# Patient Record
Sex: MALE | Race: WHITE | Employment: OTHER | URBAN - METROPOLITAN AREA
[De-identification: names, ages, dates, MRNs, and addresses within clinical notes are randomized per-mention and may not be internally consistent; named-entity substitution may affect disease eponyms.]

---

## 2018-05-02 ENCOUNTER — HOSPITAL ENCOUNTER (OUTPATIENT)
Age: 83
Discharge: HOME | End: 2018-05-04
Payer: MEDICARE

## 2018-05-02 LAB — PROSTATE SPECIFIC ANTIGEN: 3.81 NG/ML (ref 0–4)

## 2018-05-02 PROCEDURE — 84153 ASSAY OF PSA TOTAL: CPT

## 2018-05-08 PROBLEM — M54.12 CHRONIC RADICULAR CERVICAL PAIN: Status: ACTIVE | Noted: 2018-05-08

## 2018-05-08 PROBLEM — G56.03 BILATERAL CARPAL TUNNEL SYNDROME: Status: ACTIVE | Noted: 2018-05-08

## 2018-05-08 PROBLEM — G89.29 CHRONIC RADICULAR CERVICAL PAIN: Status: ACTIVE | Noted: 2018-05-08

## 2018-05-08 PROBLEM — G31.84 MCI (MILD COGNITIVE IMPAIRMENT): Status: ACTIVE | Noted: 2018-05-08

## 2018-07-17 ENCOUNTER — PROCEDURE VISIT (OUTPATIENT)
Dept: NEUROLOGY | Age: 83
End: 2018-07-17
Payer: MEDICARE

## 2018-07-17 DIAGNOSIS — M54.13 RADICULOPATHY OF CERVICOTHORACIC REGION: ICD-10-CM

## 2018-07-17 PROCEDURE — 95886 MUSC TEST DONE W/N TEST COMP: CPT | Performed by: PSYCHIATRY & NEUROLOGY

## 2018-07-17 PROCEDURE — 95913 NRV CNDJ TEST 13/> STUDIES: CPT | Performed by: PSYCHIATRY & NEUROLOGY

## 2018-07-17 NOTE — PROGRESS NOTES
[facility]  [facility]          Full Name: Carrie Mayes Gender: Male  MRN: [id number] YOB: 1931  Location[de-identified] Outpt. Visit Date: 7/17/2018 14:38  Age: 80 Years 6 Months Old  Examining Physician: Dr. Surjit Gonzalez  Referring Physician: Dr. Duglas Hart  Technician: Marcio Del Castillo   Height: 6 feet 0 inch  Weight: 195 lbs  Notes: Bilat. CTS; Chronic radicular cerv. pain      Motor NCS      Nerve / Sites Latency Amplitude Amp. 1-2 Distance Lat Diff Velocity Temp.    ms mV % cm ms m/s °C   R Median - APB      Wrist 4.01 9.0 100 8   32.9      Elbow 9.06 8.2 91.4 24 5.05 48 32.9   L Median - APB      Wrist 4.17 4.4 100 8   33.3      Elbow 9.17 4.4 99.2 24 5.00 48 33.3   R Ulnar - ADM      Wrist 3.85 6.2 100 8   32.9      B. Elbow 7.86 5.7 91.4 22 4.01 55 33      A. Elbow 10.42 5.4 86.4 10 2.55 39 32.9   L Ulnar - ADM      Wrist 4.06 5.7 100 8   33.3      B. Elbow 8.39 5.7 99.7 22 4.32 51 33.4      A. Elbow 10.57 5.7 99.3 10 2.19 46 33.3   R Ulnar - FDI      Wrist 4.38 3.0 100    32.9      B. Elbow 8.85 3.0 99.7 22 4.48 49 32.9      A. Elbow 11.15 2.8 91.6 10 2.29 44 32.9   L Ulnar - FDI      Wrist 4.79 4.1 100    33.1      B. Elbow 9.43 4.0 99.2 22 4.64 47 33      A. Elbow 11.25 4.0 97.7 10 1.82 55 33       Sensory NCS      Nerve / Sites Onset Lat Peak Lat PP Amp Amp. 1-2 Distance Velocity Temp.    ms ms µV % cm m/s °C   R Median - Digit II (Antidromic)      Wrist 1.56 2.34 12.5 100 14 90 32.7      Mid Palm 3.23 4.11 8.5 100 7 22 32.7   L Median - Digit II (Antidromic)      Wrist 1.46 2.34 12.2 100 14 96 33.3      Mid Palm 3.70 4.06 10.7 59.3 7 19 33.3   R Ulnar - Digit V (Antidromic)      Wrist 3.07 3.96 6.3 100 14 46 32.9   L Ulnar - Digit V (Antidromic)      Wrist 3.18 4.06 5.7 100 14 44 33.3   R Radial - Anatomical  (Forearm)      Forearm 2.08 2.92 4.7 100 10 48 32.7   L Radial - Anatomical  (Forearm)      Forearm 2.14 2.66 3.1 100 10 47 32.9   R Medial antebrachial cutaneous - Forearm (Elbow)      Elbow 2.24 2. 76 5.7 100 14 63 32.4   L Medial antebrachial cutaneous - Forearm (Elbow)      Elbow NR NR NR NR 14 NR 32.7   R Lateral antebrachial cutaneous - Forearm (Elbow)      Elbow 1.98 2.60 12.4 100 14 71 32.5   L Lateral antebrachial cutaneous - Forearm (Elbow)      Elbow NR NR NR NR 14 NR 32.6   R Posterior antebrachial cutaneous - Forearm (Elbow)      Elbow 1.72 2.29 4.6 100 14 81 32.4   L Posterior antebrachial cutaneous - Forearm (Elbow)      Elbow 1.98 2.34 3.4 100 14 71 32.9       Combined Sensory Index      Nerve / Sites Rec. Site Peak Lat NP Amp PP Amp Segments Peak Diff Temp. ms µV µV  ms °C   R Median - CSI      Median Thumb 3.39 3.0 4.5 Median - Radial 0.83 33      Radial Thumb 2.55 2.0 1.8 Median - Ulnar NR 32.9      Median Ring 3.91 1.5 2.6 Median palm - Ulnar palm 0.26 33      Ulnar Ring NR NR NR         Median palm Wrist 1.93 45.3 78.0         Ulnar palm Wrist 1.67 3.2 8.8         CSI     CSI 1.09    L Median - CSI      Median Thumb 3.91 2.7 11.1 Median - Radial 0.21 33.3      Radial Thumb 3.70 0.38 3.1 Median - Ulnar 0.16 33.3      Median Ring 4.53 3.0 6.3 Median palm - Ulnar palm 0.10 33      Ulnar Ring 4.38 4.9 3.1         Median palm Wrist 2.45 6.5 16.9         Ulnar palm Wrist 2.34 3.7 6.9         CSI     CSI 0.47            F  Wave      Nerve F Lat M Lat F-M Lat    ms ms ms   R Median - APB 33.7 4.1 29.6   R Ulnar - ADM 37.4 3.9 33.6   L Median - APB 34.1 3.4 30.7   L Ulnar - ADM 35.4 4.3 31.0       EMG         EMG Summary Table     Spontaneous MUAP Recruitment   Muscle IA Fib PSW Fasc H.F. Amp Dur. PPP Pattern   L. First dorsal interosseous Normal None None None None Normal Normal 1+ Markedly Dec   L. Abductor pollicis brevis Normal None None None None Normal Normal 1+ Decr   L. Abductor digiti minimi (manus) Normal None None None None Normal Normal 3+ Decr   L. Flexor pollicis longus Normal None None None None Normal Normal 1+ Decr   L.  Extensor indicis proprius Normal None None None None >/=170 cm tall  Age 48 to 61 >/=170 cm tall  Age 61 to 78 >/=170 cm tall  Amplitude drop from ankle to knee  % amplitude drop ankle to knee   4.4  5.8  5.3  5.3  1.1  5.8  5.3  5.3  1.1  5.8  5.3  5.3  1.1  10.3  71%   39  44  44  40  40  42  42  34  34  37  37  34  34   6.1  6.1  6.1  6.1  6.1  6.1  6.1  6.1  6.1  6.1  6.1  6.1  6.1     Ulnar motor (FDI)  Age <9  Age 7-34  Age 35-46  Age 52-63  Age >57   >8  >8  >7  >7  >7   >51  >51  >50  >50  >50   <3.8  <3.8  <4.3  <4.5  <4.5     Radial motor (EDC)  Age <9  Age 7-34  Age 35-46  Age 52-63  Age >57   >6  >6  >6  >5  >5   >47  >47  >50  >50  >50   <3.0  <3.0  <3.1  <3.1  <3.1   Musculocutaneous motor (Biceps)   Age <9  Age 7-34  Age 35-46  Age 52-63  Age >57   >4  >4  >4  >4  >3    <3.5  <3.5  <3.5  <3.5  <3.8   Axillary motor (Deltoid)  Age <9  Age 7-34  Age 35-46  Age 52-63  Age >57   >4  >4  >4  >4  >3    <4.8  <4.8  <4.8  <4.8  <5     Tibial motor (ADQP)  Age <9  Age 7-34  Age 35-46  Age 52-63  Age >57   >4  >4  >4  >3  >3   >41  >41  >41  >40  >40   <6.0  <6.0  <6.5  <6.5  <6.5   Peroneal motor (TA)  Age <9  Age 7-32>4  Age 35-46  Age 52-63  Age >57   >4  >4  >4  >3  >3   >41  >41  >40  >40  >40   <4  <4  <4  <4.5  <4.5     Femoral motor (RF)  Age <9  Age 7-34  Age 35-46  Age 52-63  Age >57   >4  >4  >4  >3  >3      <6.0  <6.0  <6.5  <6.5  <6.5         Nerve F  Minimal latency (ms)   Median (APB) 32   Ulnar (ADM)  32   Peroneal motor (EDB) <56   Tibial motor (AH) <56     Nerve  H-reflex latency (ms) H reflex- side to side latency  difference (ms)   Tibial  (gatroc-soleus) <34  <1.5

## 2018-08-10 ENCOUNTER — HOSPITAL ENCOUNTER (OUTPATIENT)
Age: 83
Discharge: HOME | End: 2018-08-12
Payer: MEDICARE

## 2018-08-10 PROCEDURE — 84154 ASSAY OF PSA FREE: CPT

## 2018-08-10 PROCEDURE — 84153 ASSAY OF PSA TOTAL: CPT

## 2018-08-13 LAB
PROSTATE SPECIFIC ANTIGEN FREE: 0.3 NG/ML
PROSTATE SPECIFIC ANTIGEN PERCENT FREE: 9 %
PROSTATE SPECIFIC ANTIGEN: 3.5 NG/ML (ref 0–4)

## 2018-11-27 ENCOUNTER — HOSPITAL ENCOUNTER (OUTPATIENT)
Age: 83
Discharge: HOME | End: 2018-11-29
Payer: MEDICARE

## 2018-11-27 LAB — PROSTATE SPECIFIC ANTIGEN: 3.67 NG/ML (ref 0–4)

## 2018-11-27 PROCEDURE — 84153 ASSAY OF PSA TOTAL: CPT

## 2019-04-03 ENCOUNTER — HOSPITAL ENCOUNTER (OUTPATIENT)
Age: 84
Discharge: HOME | End: 2019-04-05
Payer: MEDICARE

## 2019-04-03 LAB — PROSTATE SPECIFIC ANTIGEN: 3.63 NG/ML (ref 0–4)

## 2019-04-03 PROCEDURE — 84153 ASSAY OF PSA TOTAL: CPT

## 2019-08-07 ENCOUNTER — HOSPITAL ENCOUNTER (OUTPATIENT)
Age: 84
Discharge: HOME | End: 2019-08-09
Payer: MEDICARE

## 2019-08-07 PROCEDURE — 88305 TISSUE EXAM BY PATHOLOGIST: CPT

## 2019-09-25 ENCOUNTER — TELEPHONE (OUTPATIENT)
Dept: ENT CLINIC | Age: 84
End: 2019-09-25

## 2019-09-25 NOTE — TELEPHONE ENCOUNTER
Dr Mayes [facility] called to schedule this patient with a ruptured right tympanic membrane. He is scheduled on 11/19/19 at 8:30. Dr Romel Zimmer wanted him to be seen as soon as possible. Referral is urgent. Please call to move patient up. Thank you.

## 2019-09-26 ENCOUNTER — OFFICE VISIT (OUTPATIENT)
Dept: ENT CLINIC | Age: 84
End: 2019-09-26
Payer: MEDICARE

## 2019-09-26 ENCOUNTER — PROCEDURE VISIT (OUTPATIENT)
Dept: AUDIOLOGY | Age: 84
End: 2019-09-26
Payer: MEDICARE

## 2019-09-26 VITALS
SYSTOLIC BLOOD PRESSURE: 120 MMHG | WEIGHT: 195 LBS | HEIGHT: 71 IN | HEART RATE: 68 BPM | DIASTOLIC BLOOD PRESSURE: 72 MMHG | BODY MASS INDEX: 27.3 KG/M2

## 2019-09-26 DIAGNOSIS — H72.91 PERFORATION OF RIGHT TYMPANIC MEMBRANE: ICD-10-CM

## 2019-09-26 DIAGNOSIS — H93.8X1 IRRITATION OF RIGHT EAR: ICD-10-CM

## 2019-09-26 DIAGNOSIS — H91.93 DECREASED HEARING OF BOTH EARS: ICD-10-CM

## 2019-09-26 DIAGNOSIS — H61.21 IMPACTED CERUMEN, RIGHT EAR: Primary | ICD-10-CM

## 2019-09-26 PROCEDURE — 69210 REMOVE IMPACTED EAR WAX UNI: CPT | Performed by: PHYSICIAN ASSISTANT

## 2019-09-26 PROCEDURE — 99203 OFFICE O/P NEW LOW 30 MIN: CPT | Performed by: PHYSICIAN ASSISTANT

## 2019-09-26 PROCEDURE — 92567 TYMPANOMETRY: CPT | Performed by: AUDIOLOGIST

## 2019-09-26 RX ORDER — CIPROFLOXACIN HYDROCHLORIDE 3.5 MG/ML
SOLUTION/ DROPS TOPICAL
Qty: 1 BOTTLE | Refills: 0 | Status: SHIPPED | OUTPATIENT
Start: 2019-09-26 | End: 2019-10-06

## 2019-09-26 ASSESSMENT — ENCOUNTER SYMPTOMS
EYES NEGATIVE: 1
COUGH: 0
GASTROINTESTINAL NEGATIVE: 1
VOMITING: 0
RESPIRATORY NEGATIVE: 1
NAUSEA: 0

## 2019-09-26 NOTE — PROGRESS NOTES
completed. DIAGNOSIS:    ICD-10-CM    1. Impacted cerumen, right ear H61.21 NC REMOVAL IMPACTED CERUMEN INSTRUMENTATION UNILAT   2. Irritation of right ear H93.8X1      IMPRESSION/PLAN:  Cerumen removed. Hearing improved and close to baseline as per patient. Continues to have mild echoing of the right ear- tymps completed no abnormalities. Advised that hearing eval can be completed for further assessment if not improved in a couple days. No ear infection on exam. Excoriation of right ear canal- ciprofloxacin drops to be used for 5 days to help treat any early infection. Amoxicillin can be discontinued. If any further assessment of hearing- with discussion of hearing aids, further assessment of dizziness- may consider vestibular therapy in the future if needed, or cerumen recheck is desired advised to call the office to be seen again. Otherwise continue closely following with PCP. The plan was explained and patient is without any further questions. Follow up prn, or if any continuing, new or worsening symptoms call the office to be seen sooner or report to the emergency department. This note was done using voice recognition software. There may be accidental errors in grammar or spelling.    ALETHEA Lay

## 2019-10-10 ENCOUNTER — HOSPITAL ENCOUNTER (OUTPATIENT)
Age: 84
Discharge: HOME | End: 2019-10-12
Payer: MEDICARE

## 2019-10-10 LAB — PROSTATE SPECIFIC ANTIGEN: 3.8 NG/ML (ref 0–4)

## 2019-10-10 PROCEDURE — 84153 ASSAY OF PSA TOTAL: CPT

## 2021-04-05 ENCOUNTER — HOSPITAL ENCOUNTER (EMERGENCY)
Age: 86
Discharge: HOME | End: 2021-04-05
Attending: EMERGENCY MEDICINE
Payer: MEDICARE

## 2021-04-05 ENCOUNTER — APPOINTMENT (OUTPATIENT)
Dept: GENERAL RADIOLOGY | Age: 86
End: 2021-04-05
Payer: MEDICARE

## 2021-04-05 VITALS
HEIGHT: 72 IN | RESPIRATION RATE: 16 BRPM | TEMPERATURE: 98.6 F | OXYGEN SATURATION: 96 % | BODY MASS INDEX: 24.38 KG/M2 | DIASTOLIC BLOOD PRESSURE: 58 MMHG | SYSTOLIC BLOOD PRESSURE: 120 MMHG | HEART RATE: 84 BPM | WEIGHT: 180 LBS

## 2021-04-05 DIAGNOSIS — Z23 NEED FOR TDAP VACCINATION: ICD-10-CM

## 2021-04-05 DIAGNOSIS — S51.811A FOREARM LACERATION, RIGHT, INITIAL ENCOUNTER: ICD-10-CM
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
## 2021-04-05 PROCEDURE — 6370000000 HC RX 637 (ALT 250 FOR IP): Performed by: EMERGENCY MEDICINE

## 2021-04-05 PROCEDURE — 12002 RPR S/N/AX/GEN/TRNK2.6-7.5CM: CPT

## 2021-04-05 PROCEDURE — 90715 TDAP VACCINE 7 YRS/> IM: CPT | Performed by: EMERGENCY MEDICINE

## 2021-04-05 PROCEDURE — 6360000002 HC RX W HCPCS: Performed by: EMERGENCY MEDICINE

## 2021-04-05 PROCEDURE — 90471 IMMUNIZATION ADMIN: CPT | Performed by: EMERGENCY MEDICINE

## 2021-04-05 PROCEDURE — 73090 X-RAY EXAM OF FOREARM: CPT

## 2021-04-05 PROCEDURE — 99283 EMERGENCY DEPT VISIT LOW MDM: CPT

## 2021-04-05 PROCEDURE — 2500000003 HC RX 250 WO HCPCS: Performed by: EMERGENCY MEDICINE

## 2021-04-05 RX ORDER — CEPHALEXIN 500 MG/1
500 CAPSULE ORAL 4 TIMES DAILY
Qty: 20 CAPSULE | Refills: 0 | Status: SHIPPED | OUTPATIENT
Start: 2021-04-05 | End: 2021-04-10

## 2021-04-05 RX ORDER — DIAPER,BRIEF,INFANT-TODD,DISP
EACH MISCELLANEOUS ONCE
Status: COMPLETED | OUTPATIENT
Start: 2021-04-05 | End: 2021-04-05

## 2021-04-05 RX ORDER — CEPHALEXIN 500 MG/1
500 CAPSULE ORAL ONCE
Status: COMPLETED | OUTPATIENT
Start: 2021-04-05 | End: 2021-04-05

## 2021-04-05 RX ORDER — LIDOCAINE HYDROCHLORIDE 10 MG/ML
5 INJECTION, SOLUTION INFILTRATION; PERINEURAL ONCE
Status: COMPLETED | OUTPATIENT
Start: 2021-04-05 | End: 2021-04-05

## 2021-04-05 RX ADMIN — CEPHALEXIN 500 MG: 500 CAPSULE ORAL at 18:14

## 2021-04-05 RX ADMIN — LIDOCAINE HYDROCHLORIDE 5 ML: 10 INJECTION, SOLUTION INFILTRATION; PERINEURAL at 18:14

## 2021-04-05 RX ADMIN — BACITRACIN: 500 OINTMENT TOPICAL at 18:14

## 2021-04-05 RX ADMIN — TETANUS TOXOID, REDUCED DIPHTHERIA TOXOID AND ACELLULAR PERTUSSIS VACCINE, ADSORBED 0.5 ML: 5; 2.5; 8; 8; 2.5 SUSPENSION INTRAMUSCULAR at 18:14

## 2021-04-05 ASSESSMENT — ENCOUNTER SYMPTOMS
COUGH: 0
VOMITING: 0
ABDOMINAL PAIN: 0
EYE DISCHARGE: 0
SHORTNESS OF BREATH: 0
DIARRHEA: 0
WHEEZING: 0
SORE THROAT: 0
EYE REDNESS: 0
SINUS PRESSURE: 0
BACK PAIN: 0
NAUSEA: 0
EYE PAIN: 0

## 2021-04-05 ASSESSMENT — PAIN SCALES - GENERAL: PAINLEVEL_OUTOF10: 0

## 2021-04-05 NOTE — ED NOTES
Bed:  [place] [address]  Expected date:   Expected time:   Means of arrival:   Comments:  EMS     Jm Allred RN  04/05/21 [id number]

## 2021-04-05 NOTE — ED PROVIDER NOTES
80-year-old male presents to emergency department after mechanical fall at home. He states he did not hit his head he states he did not lose consciousness he is on no blood thinners. He states he lost his balance trying to reach for something and fell back and cut his right forearm. He states he does not know when his last tetanus shot was. He states no other complaints at this time he states full range of motion of the right upper extremity states no headache or neck pain. States of chest wall pain abdominal pain or other extremity pain. The history is provided by the patient. Laceration  Location:  Shoulder/arm  Shoulder/arm laceration location:  R forearm  Length:  4cm  Depth: Through dermis  Quality: straight    Bleeding: controlled    Time since incident:  2 hours  Laceration mechanism:  Fall  Pain details:     Severity:  No pain    Timing:  Constant    Progression:  Waxing and waning  Foreign body present:  No foreign bodies  Relieved by:  Nothing  Worsened by:  Nothing  Ineffective treatments:  None tried  Tetanus status:  Unknown  Associated symptoms: no fever, no focal weakness, no numbness, no rash, no redness, no swelling and no streaking         Review of Systems   Constitutional: Negative for chills and fever. HENT: Negative for ear pain, sinus pressure and sore throat. Eyes: Negative for pain, discharge and redness. Respiratory: Negative for cough, shortness of breath and wheezing. Cardiovascular: Negative for chest pain. Gastrointestinal: Negative for abdominal pain, diarrhea, nausea and vomiting. Genitourinary: Negative for dysuria and frequency. Musculoskeletal: Negative for arthralgias and back pain. Skin: Positive for wound. Negative for rash. Neurological: Negative for focal weakness, weakness and headaches. Hematological: Negative for adenopathy. All other systems reviewed and are negative.        Physical Exam  Constitutional:       Appearance: Normal appearance. HENT:      Head: Normocephalic and atraumatic. Mouth/Throat:      Mouth: Mucous membranes are moist.   Eyes:      Extraocular Movements: Extraocular movements intact. Pupils: Pupils are equal, round, and reactive to light. Neck:      Musculoskeletal: Normal range of motion and neck supple. Cardiovascular:      Rate and Rhythm: Normal rate and regular rhythm. Pulses: Normal pulses. Heart sounds: Normal heart sounds. Pulmonary:      Effort: Pulmonary effort is normal.      Breath sounds: Normal breath sounds. Chest:      Chest wall: No tenderness. Abdominal:      General: Abdomen is flat. Bowel sounds are normal.      Palpations: Abdomen is soft. Tenderness: There is no abdominal tenderness. Musculoskeletal: Normal range of motion. Skin:     General: Skin is warm. Capillary Refill: Capillary refill takes less than 2 seconds. Findings: Laceration present. Comments: Patient has full range of motion of right upper extremity. Pulses 2+ radial cap refill less than 2 seconds. Neurological:      Mental Status: He is alert. Procedures   Laceration Repair Procedure Note    Indication: Laceration    Procedure: The patient was placed in the appropriate position and anesthesia around the laceration was obtained by infiltration using 1% Lidocaine without epinephrine. The area was then cleansed using Shur-Clens. The laceration was closed with 4-0 Ethilon using interrupted sutures. There were no additional lacerations requiring repair. The wound area was then dressed with bacitracin, a sterile dressing and gauze. Minimal debridement was preformed, flaps were aligned. No foreign body was identified. Total repaired wound length: 6 cm. Other Items: Suture count: 11    The patient tolerated the procedure well.     Complications: None              MDM  Number of Diagnoses or Management Options  Fall from chair, initial encounter  Forearm laceration, right, initial encounter  Need for Tdap vaccination  Diagnosis management comments: Patient seen and examined. X-ray of forearm reveals no foreign bodies and no shows no fracture. Tdap updated. Patient given antibiotic due to environment which she obtained laceration. Laceration was repaired patient recommended follow-up with PCP for suture removal.             --------------------------------------------- PAST HISTORY ---------------------------------------------  Past Medical History:  has a past medical history of Acid reflux, BPH (benign prostatic hyperplasia), Cancer ([address] Diverticulosis, Hearing loss, Hiatal hernia, Hyperlipidemia, Osteoarthritis, and Radiculopathy of cervical spine. Past Surgical History:  has a past surgical history that includes Cystoscopy (age 62); Skin cancer excision; hernia repair; Cataract removal with implant; lipoma resection; other surgical history (8/7/2012); and Tonsillectomy. Social History:  reports that he has never smoked. He has never used smokeless tobacco. He reports current alcohol use. He reports that he does not use drugs. Family History: family history includes Cancer in his father and mother. The patients home medications have been reviewed. Allergies: Patient has no known allergies. -------------------------------------------------- RESULTS -------------------------------------------------  Labs:  No results found for this visit on 04/05/21. Radiology:  XR RADIUS ULNA RIGHT (2 VIEWS)   Final Result   No acute osseous abnormality. Severe arthritic changes in the scaphoid trapezium junction.             ------------------------- NURSING NOTES AND VITALS REVIEWED ---------------------------  Date / Time Roomed:  4/5/2021  4:32 PM  ED Bed Assignment:  [email or web address]    The nursing notes within the ED encounter and vital signs as below have been reviewed.    BP (!) 120/58   Pulse 84   Temp 98.6 °F (37 °C) (Oral)   Resp 16 Ht 6' (1.829 m)   Wt 180 lb (81.6 kg)   SpO2 96%   BMI 24.41 kg/m²   Oxygen Saturation Interpretation: Normal      ------------------------------------------ PROGRESS NOTES   I have spoken with the patient and discussed todays results, in addition to providing specific details for the plan of care and counseling regarding the diagnosis and prognosis. Their questions are answered at this time and they are agreeable with the plan. I discussed at length with them reasons for immediate return here for re evaluation. They will followup with their primary care physician by calling their office tomorrow. --------------------------------- ADDITIONAL PROVIDER NOTES ---------------------------------  At this time the patient is without objective evidence of an acute process requiring hospitalization or inpatient management. They have remained hemodynamically stable throughout their entire ED visit and are stable for discharge with outpatient follow-up. The plan has been discussed in detail and they are aware of the specific conditions for emergent return, as well as the importance of follow-up. New Prescriptions    CEPHALEXIN (KEFLEX) 500 MG CAPSULE    Take 1 capsule by mouth 4 times daily for 5 days       Diagnosis:  1. Fall from chair, initial encounter    2. Forearm laceration, right, initial encounter    3. Need for Tdap vaccination        Disposition:  Patient's disposition: Discharge to home  Patient's condition is stable.          Deanne Vera DO  04/06/21 [id number]

## 2021-06-24 ENCOUNTER — APPOINTMENT (OUTPATIENT)
Dept: CT IMAGING | Age: 86
DRG: 103 | End: 2021-06-24
Payer: MEDICARE

## 2021-06-24 ENCOUNTER — APPOINTMENT (OUTPATIENT)
Dept: GENERAL RADIOLOGY | Age: 86
DRG: 103 | End: 2021-06-24
Payer: MEDICARE

## 2021-06-24 ENCOUNTER — HOSPITAL ENCOUNTER (INPATIENT)
Age: 86
LOS: 6 days | Discharge: HOME | DRG: 103 | End: 2021-06-30
Attending: EMERGENCY MEDICINE | Admitting: INTERNAL MEDICINE
Payer: MEDICARE

## 2021-06-24 DIAGNOSIS — R47.01 APHASIA: ICD-10-CM

## 2021-06-24 DIAGNOSIS — R51.9 ACUTE INTRACTABLE HEADACHE, UNSPECIFIED HEADACHE TYPE: Primary | ICD-10-CM

## 2021-06-24 PROBLEM — R29.90 STROKE-LIKE SYMPTOMS: Status: ACTIVE | Noted: 2021-06-24

## 2021-06-24 LAB
ALBUMIN SERPL-MCNC: 4.3 G/DL (ref 3.5–5.2)
ALP BLD-CCNC: 68 U/L (ref 40–129)
ALT SERPL-CCNC: 17 U/L (ref 0–40)
ANION GAP SERPL CALCULATED.3IONS-SCNC: 12 MMOL/L (ref 7–16)
APTT: 21.6 SEC (ref 24.5–35.1)
AST SERPL-CCNC: 15 U/L (ref 0–39)
BACTERIA: NORMAL /HPF
BASOPHILS ABSOLUTE: 0.02 E9/L (ref 0–0.2)
BASOPHILS RELATIVE PERCENT: 0.3 % (ref 0–2)
BILIRUB SERPL-MCNC: 0.5 MG/DL (ref 0–1.2)
BILIRUBIN URINE: NEGATIVE
BLOOD, URINE: NEGATIVE
BUN BLDV-MCNC: 20 MG/DL (ref 6–23)
CALCIUM SERPL-MCNC: 9.4 MG/DL (ref 8.6–10.2)
CHLORIDE BLD-SCNC: 101 MMOL/L (ref 98–107)
CHOLESTEROL, TOTAL: 168 MG/DL (ref 0–199)
CLARITY: CLEAR
CO2: 27 MMOL/L (ref 22–29)
COLOR: YELLOW
CREAT SERPL-MCNC: 1.2 MG/DL (ref 0.7–1.2)
EKG ATRIAL RATE: 81 BPM
EKG P AXIS: 63 DEGREES
EKG P-R INTERVAL: 216 MS
EKG Q-T INTERVAL: 384 MS
EKG QRS DURATION: 84 MS
EKG QTC CALCULATION (BAZETT): 446 MS
EKG R AXIS: 59 DEGREES
EKG T AXIS: 50 DEGREES
EKG VENTRICULAR RATE: 81 BPM
EOSINOPHILS ABSOLUTE: 0.04 E9/L (ref 0.05–0.5)
EOSINOPHILS RELATIVE PERCENT: 0.6 % (ref 0–6)
GFR AFRICAN AMERICAN: >60
GFR NON-AFRICAN AMERICAN: 57 ML/MIN/1.73
GLUCOSE BLD-MCNC: 105 MG/DL (ref 74–99)
GLUCOSE URINE: NEGATIVE MG/DL
HBA1C MFR BLD: 4.7 % (ref 4–5.6)
HCT VFR BLD CALC: 38.6 % (ref 37–54)
HDLC SERPL-MCNC: 42 MG/DL
HEMOGLOBIN: 13.3 G/DL (ref 12.5–16.5)
IMMATURE GRANULOCYTES #: 0.02 E9/L
IMMATURE GRANULOCYTES %: 0.3 % (ref 0–5)
INR BLD: 1.3
KETONES, URINE: NEGATIVE MG/DL
LACTIC ACID, SEPSIS: 1 MMOL/L (ref 0.5–1.9)
LACTIC ACID, SEPSIS: 1.1 MMOL/L (ref 0.5–1.9)
LDL CHOLESTEROL CALCULATED: 111 MG/DL (ref 0–99)
LEUKOCYTE ESTERASE, URINE: NEGATIVE
LYMPHOCYTES ABSOLUTE: 0.94 E9/L (ref 1.5–4)
LYMPHOCYTES RELATIVE PERCENT: 14 % (ref 20–42)
MCH RBC QN AUTO: 33.3 PG (ref 26–35)
MCHC RBC AUTO-ENTMCNC: 34.5 % (ref 32–34.5)
MCV RBC AUTO: 96.5 FL (ref 80–99.9)
METER GLUCOSE: 101 MG/DL (ref 74–99)
MONOCYTES ABSOLUTE: 0.57 E9/L (ref 0.1–0.95)
MONOCYTES RELATIVE PERCENT: 8.5 % (ref 2–12)
NEUTROPHILS ABSOLUTE: 5.11 E9/L (ref 1.8–7.3)
NEUTROPHILS RELATIVE PERCENT: 76.3 % (ref 43–80)
NITRITE, URINE: NEGATIVE
PDW BLD-RTO: 12.8 FL (ref 11.5–15)
PH UA: 8 (ref 5–9)
PLATELET # BLD: 245 E9/L (ref 130–450)
PMV BLD AUTO: 9.4 FL (ref 7–12)
POTASSIUM REFLEX MAGNESIUM: 3.8 MMOL/L (ref 3.5–5)
PROTEIN UA: NORMAL MG/DL
PROTHROMBIN TIME: 13.9 SEC (ref 9.3–12.4)
RBC # BLD: 4 E12/L (ref 3.8–5.8)
RBC UA: NORMAL /HPF (ref 0–2)
SEDIMENTATION RATE, ERYTHROCYTE: 3 MM/HR (ref 0–15)
SODIUM BLD-SCNC: 140 MMOL/L (ref 132–146)
SPECIFIC GRAVITY UA: 1.02 (ref 1–1.03)
TOTAL PROTEIN: 6.6 G/DL (ref 6.4–8.3)
TRIGL SERPL-MCNC: 73 MG/DL (ref 0–149)
UROBILINOGEN, URINE: 1 E.U./DL
VLDLC SERPL CALC-MCNC: 15 MG/DL
WBC # BLD: 6.7 E9/L (ref 4.5–11.5)
WBC UA: NORMAL /HPF (ref 0–5)

## 2021-06-24 PROCEDURE — 71045 X-RAY EXAM CHEST 1 VIEW: CPT

## 2021-06-24 PROCEDURE — 86140 C-REACTIVE PROTEIN: CPT

## 2021-06-24 PROCEDURE — 6370000000 HC RX 637 (ALT 250 FOR IP): Performed by: PSYCHIATRY & NEUROLOGY

## 2021-06-24 PROCEDURE — 80061 LIPID PANEL: CPT

## 2021-06-24 PROCEDURE — 82962 GLUCOSE BLOOD TEST: CPT

## 2021-06-24 PROCEDURE — 6360000004 HC RX CONTRAST MEDICATION: Performed by: RADIOLOGY

## 2021-06-24 PROCEDURE — 2580000003 HC RX 258: Performed by: EMERGENCY MEDICINE

## 2021-06-24 PROCEDURE — 93005 ELECTROCARDIOGRAM TRACING: CPT | Performed by: EMERGENCY MEDICINE

## 2021-06-24 PROCEDURE — 6360000002 HC RX W HCPCS: Performed by: EMERGENCY MEDICINE

## 2021-06-24 PROCEDURE — 70498 CT ANGIOGRAPHY NECK: CPT

## 2021-06-24 PROCEDURE — 85025 COMPLETE CBC W/AUTO DIFF WBC: CPT

## 2021-06-24 PROCEDURE — 6360000002 HC RX W HCPCS: Performed by: INTERNAL MEDICINE

## 2021-06-24 PROCEDURE — 93010 ELECTROCARDIOGRAM REPORT: CPT | Performed by: INTERNAL MEDICINE

## 2021-06-24 PROCEDURE — 2060000000 HC ICU INTERMEDIATE R&B

## 2021-06-24 PROCEDURE — 85610 PROTHROMBIN TIME: CPT

## 2021-06-24 PROCEDURE — 6370000000 HC RX 637 (ALT 250 FOR IP): Performed by: EMERGENCY MEDICINE

## 2021-06-24 PROCEDURE — 99449 NTRPROF PH1/NTRNET/EHR 31/>: CPT | Performed by: PSYCHIATRY & NEUROLOGY

## 2021-06-24 PROCEDURE — 99284 EMERGENCY DEPT VISIT MOD MDM: CPT

## 2021-06-24 PROCEDURE — 97165 OT EVAL LOW COMPLEX 30 MIN: CPT

## 2021-06-24 PROCEDURE — 70498 CT ANGIOGRAPHY NECK: CPT | Performed by: RADIOLOGY

## 2021-06-24 PROCEDURE — 83605 ASSAY OF LACTIC ACID: CPT

## 2021-06-24 PROCEDURE — 83036 HEMOGLOBIN GLYCOSYLATED A1C: CPT

## 2021-06-24 PROCEDURE — 70496 CT ANGIOGRAPHY HEAD: CPT

## 2021-06-24 PROCEDURE — 70496 CT ANGIOGRAPHY HEAD: CPT | Performed by: RADIOLOGY

## 2021-06-24 PROCEDURE — 97535 SELF CARE MNGMENT TRAINING: CPT

## 2021-06-24 PROCEDURE — 80053 COMPREHEN METABOLIC PANEL: CPT

## 2021-06-24 PROCEDURE — 6370000000 HC RX 637 (ALT 250 FOR IP): Performed by: INTERNAL MEDICINE

## 2021-06-24 PROCEDURE — 0042T CT BRAIN PERFUSION: CPT | Performed by: RADIOLOGY

## 2021-06-24 PROCEDURE — 2580000003 HC RX 258: Performed by: INTERNAL MEDICINE

## 2021-06-24 PROCEDURE — 87040 BLOOD CULTURE FOR BACTERIA: CPT

## 2021-06-24 PROCEDURE — 99222 1ST HOSP IP/OBS MODERATE 55: CPT | Performed by: PSYCHIATRY & NEUROLOGY

## 2021-06-24 PROCEDURE — 36415 COLL VENOUS BLD VENIPUNCTURE: CPT

## 2021-06-24 PROCEDURE — 81001 URINALYSIS AUTO W/SCOPE: CPT

## 2021-06-24 PROCEDURE — 85651 RBC SED RATE NONAUTOMATED: CPT

## 2021-06-24 PROCEDURE — 70450 CT HEAD/BRAIN W/O DYE: CPT

## 2021-06-24 PROCEDURE — 0042T CT BRAIN PERFUSION: CPT

## 2021-06-24 PROCEDURE — 85730 THROMBOPLASTIN TIME PARTIAL: CPT

## 2021-06-24 RX ORDER — ATORVASTATIN CALCIUM 40 MG/1
40 TABLET, FILM COATED ORAL NIGHTLY
Status: DISCONTINUED | OUTPATIENT
Start: 2021-06-24 | End: 2021-06-30 | Stop reason: HOSPADM

## 2021-06-24 RX ORDER — HYDRALAZINE HYDROCHLORIDE 20 MG/ML
10 INJECTION INTRAMUSCULAR; INTRAVENOUS EVERY 4 HOURS PRN
Status: DISCONTINUED | OUTPATIENT
Start: 2021-06-24 | End: 2021-06-30 | Stop reason: HOSPADM

## 2021-06-24 RX ORDER — VITAMIN B COMPLEX
1000 TABLET ORAL DAILY
Status: DISCONTINUED | OUTPATIENT
Start: 2021-06-24 | End: 2021-06-30 | Stop reason: HOSPADM

## 2021-06-24 RX ORDER — ONDANSETRON 2 MG/ML
4 INJECTION INTRAMUSCULAR; INTRAVENOUS EVERY 6 HOURS PRN
Status: DISCONTINUED | OUTPATIENT
Start: 2021-06-24 | End: 2021-06-30 | Stop reason: HOSPADM

## 2021-06-24 RX ORDER — SODIUM CHLORIDE 0.9 % (FLUSH) 0.9 %
5-40 SYRINGE (ML) INJECTION PRN
Status: DISCONTINUED | OUTPATIENT
Start: 2021-06-24 | End: 2021-06-30 | Stop reason: HOSPADM

## 2021-06-24 RX ORDER — POLYETHYLENE GLYCOL 3350 17 G/17G
17 POWDER, FOR SOLUTION ORAL DAILY PRN
Status: DISCONTINUED | OUTPATIENT
Start: 2021-06-24 | End: 2021-06-30 | Stop reason: HOSPADM

## 2021-06-24 RX ORDER — CLOPIDOGREL BISULFATE 75 MG/1
75 TABLET ORAL DAILY
Status: DISCONTINUED | OUTPATIENT
Start: 2021-06-24 | End: 2021-06-30 | Stop reason: HOSPADM

## 2021-06-24 RX ORDER — SODIUM CHLORIDE 9 MG/ML
25 INJECTION, SOLUTION INTRAVENOUS PRN
Status: DISCONTINUED | OUTPATIENT
Start: 2021-06-24 | End: 2021-06-30 | Stop reason: HOSPADM

## 2021-06-24 RX ORDER — TRIAMTERENE AND HYDROCHLOROTHIAZIDE 37.5; 25 MG/1; MG/1
1 CAPSULE ORAL EVERY MORNING
Status: DISCONTINUED | OUTPATIENT
Start: 2021-06-24 | End: 2021-06-24

## 2021-06-24 RX ORDER — SODIUM CHLORIDE 0.9 % (FLUSH) 0.9 %
5-40 SYRINGE (ML) INJECTION EVERY 12 HOURS SCHEDULED
Status: DISCONTINUED | OUTPATIENT
Start: 2021-06-24 | End: 2021-06-30 | Stop reason: HOSPADM

## 2021-06-24 RX ORDER — PANTOPRAZOLE SODIUM 40 MG/1
40 TABLET, DELAYED RELEASE ORAL
Status: DISCONTINUED | OUTPATIENT
Start: 2021-06-24 | End: 2021-06-30 | Stop reason: HOSPADM

## 2021-06-24 RX ORDER — ACETAMINOPHEN 650 MG/1
650 SUPPOSITORY RECTAL EVERY 6 HOURS PRN
Status: DISCONTINUED | OUTPATIENT
Start: 2021-06-24 | End: 2021-06-30 | Stop reason: HOSPADM

## 2021-06-24 RX ORDER — ASPIRIN 81 MG/1
324 TABLET, CHEWABLE ORAL ONCE
Status: COMPLETED | OUTPATIENT
Start: 2021-06-24 | End: 2021-06-24

## 2021-06-24 RX ORDER — ONDANSETRON 4 MG/1
4 TABLET, ORALLY DISINTEGRATING ORAL EVERY 8 HOURS PRN
Status: DISCONTINUED | OUTPATIENT
Start: 2021-06-24 | End: 2021-06-30 | Stop reason: HOSPADM

## 2021-06-24 RX ORDER — ASPIRIN 81 MG/1
81 TABLET, CHEWABLE ORAL DAILY
Status: DISCONTINUED | OUTPATIENT
Start: 2021-06-25 | End: 2021-06-30 | Stop reason: HOSPADM

## 2021-06-24 RX ORDER — PHENAZOPYRIDINE HYDROCHLORIDE 100 MG/1
100 TABLET, FILM COATED ORAL 3 TIMES DAILY PRN
COMMUNITY

## 2021-06-24 RX ORDER — ACETAMINOPHEN 325 MG/1
650 TABLET ORAL EVERY 4 HOURS PRN
Status: DISCONTINUED | OUTPATIENT
Start: 2021-06-24 | End: 2021-06-24 | Stop reason: SDUPTHER

## 2021-06-24 RX ORDER — MECLIZINE HCL 12.5 MG/1
12.5 TABLET ORAL 3 TIMES DAILY PRN
COMMUNITY

## 2021-06-24 RX ORDER — MECLIZINE HCL 12.5 MG/1
12.5 TABLET ORAL 3 TIMES DAILY PRN
Status: DISCONTINUED | OUTPATIENT
Start: 2021-06-24 | End: 2021-06-30 | Stop reason: HOSPADM

## 2021-06-24 RX ORDER — ACETAMINOPHEN 325 MG/1
650 TABLET ORAL EVERY 6 HOURS PRN
Status: DISCONTINUED | OUTPATIENT
Start: 2021-06-24 | End: 2021-06-30 | Stop reason: HOSPADM

## 2021-06-24 RX ORDER — GABAPENTIN 300 MG/1
300 CAPSULE ORAL 3 TIMES DAILY
COMMUNITY

## 2021-06-24 RX ADMIN — ACETAMINOPHEN 650 MG: 325 TABLET ORAL at 06:42

## 2021-06-24 RX ADMIN — HYDRALAZINE HYDROCHLORIDE 10 MG: 20 INJECTION INTRAMUSCULAR; INTRAVENOUS at 06:42

## 2021-06-24 RX ADMIN — ACETAMINOPHEN 650 MG: 325 TABLET ORAL at 10:19

## 2021-06-24 RX ADMIN — PANTOPRAZOLE SODIUM 40 MG: 40 TABLET, DELAYED RELEASE ORAL at 12:54

## 2021-06-24 RX ADMIN — IOPAMIDOL 100 ML: 755 INJECTION, SOLUTION INTRAVENOUS at 02:01

## 2021-06-24 RX ADMIN — Medication 10 ML: at 21:50

## 2021-06-24 RX ADMIN — Medication 10 ML: at 12:51

## 2021-06-24 RX ADMIN — Medication 10 ML: at 10:20

## 2021-06-24 RX ADMIN — ENOXAPARIN SODIUM 40 MG: 40 INJECTION SUBCUTANEOUS at 10:19

## 2021-06-24 RX ADMIN — CLOPIDOGREL 75 MG: 75 TABLET, FILM COATED ORAL at 20:27

## 2021-06-24 RX ADMIN — Medication 10 ML: at 12:52

## 2021-06-24 RX ADMIN — ATORVASTATIN CALCIUM 40 MG: 40 TABLET, FILM COATED ORAL at 22:21

## 2021-06-24 RX ADMIN — Medication 1000 UNITS: at 12:50

## 2021-06-24 RX ADMIN — ASPIRIN 324 MG: 81 TABLET, CHEWABLE ORAL at 03:25

## 2021-06-24 RX ADMIN — ONDANSETRON 4 MG: 4 TABLET, ORALLY DISINTEGRATING ORAL at 13:27

## 2021-06-24 ASSESSMENT — ENCOUNTER SYMPTOMS
SHORTNESS OF BREATH: 0
CHEST TIGHTNESS: 0
RHINORRHEA: 0
DIARRHEA: 0
COUGH: 0
TROUBLE SWALLOWING: 0
VOMITING: 0
ABDOMINAL PAIN: 0
EYE PAIN: 0
BLOOD IN STOOL: 0
NAUSEA: 0
WHEEZING: 0
BACK PAIN: 0

## 2021-06-24 ASSESSMENT — PAIN DESCRIPTION - DESCRIPTORS
DESCRIPTORS: HEADACHE
DESCRIPTORS: HEADACHE

## 2021-06-24 ASSESSMENT — PAIN SCALES - GENERAL
PAINLEVEL_OUTOF10: 8
PAINLEVEL_OUTOF10: 0
PAINLEVEL_OUTOF10: 6

## 2021-06-24 ASSESSMENT — PAIN DESCRIPTION - LOCATION
LOCATION: HEAD
LOCATION: HEAD

## 2021-06-24 NOTE — ED PROVIDER NOTES
[facility]  eMERGENCY dEPARTMENT eNCOUnter      Pt Name: Rafael Pan  MRN: [id number]  [place] 11/8/1931  Date of evaluation: 6/24/2021  Provider: Pilar Lopez MD     [address] [place] [address]       Chief Complaint   Patient presents with    Aphasia     started 9 pm    Headache     going on for a while         HISTORY OF PRESENT ILLNESS   (Location/Symptom, Timing/Onset,Context/Setting, Quality, Duration, Modifying Factors, Severity) Note limiting factors. Presents here with headache and aphasia. Patient has been having a headache for the last couple of days. It got worse today and then at 9 PM his wife said that he could walk normally and get his words out. She said that that got better but then recurred again at 10:00. He has been confused since then so she called and brought him here for evaluation. The patient really cannot answer any specific questions he just states that he has a \"terrible headache\". He will follow commands. He is somewhat agitated. His speech is not slurred but he appears to have both some expressive as well as receptive aphasia. He is moving all 4 extremities. Rafael Pan is a 80 y.o. male who presents to the emergency department      Nursing Notes were reviewed. REVIEW OF SYSTEMS    (2+ for4; 10+ for level 5)     Review of Systems   Reason unable to perform ROS: Per wife. Patient unable to answer any specific questions. Constitutional: Negative for appetite change, chills, fatigue, fever and unexpected weight change. HENT: Negative for congestion, drooling, nosebleeds, rhinorrhea and trouble swallowing. Eyes: Negative for pain and visual disturbance. Respiratory: Negative for cough, chest tightness, shortness of breath and wheezing. Cardiovascular: Negative for chest pain, palpitations and leg swelling. Gastrointestinal: Negative for abdominal pain, blood in stool, diarrhea, nausea and vomiting. DELAYED RELEASE CAPSULE    Take 1 capsule by mouth daily    TRIAMTERENE-HYDROCHLOROTHIAZIDE (DYAZIDE) 37.5-25 MG PER CAPSULE    Take 1 capsule by mouth every morning            Patient has no known allergies. FAMILY HISTORY       Family History   Problem Relation Age of Onset    Cancer Mother     Cancer Father           SOCIAL HISTORY       Social History     Socioeconomic History    Marital status:      Spouse name: None    Number of children: None    Years of education: None    Highest education level: None   Occupational History    None   Tobacco Use    Smoking status: Never Smoker    Smokeless tobacco: Never Used   Substance and Sexual Activity    Alcohol use: Yes     Comment: social    Drug use: No    Sexual activity: None   Other Topics Concern    None   Social History Narrative    None     Social Determinants of Health     Financial Resource Strain:     Difficulty of Paying Living Expenses:    Food Insecurity:     Worried About Running Out of Food in the Last Year:     Ran Out of Food in the Last Year:    Transportation Needs:     Lack of Transportation (Medical):  Lack of Transportation (Non-Medical):    Physical Activity:     Days of Exercise per Week:     Minutes of Exercise per Session:    Stress:     Feeling of Stress :    Social Connections:     Frequency of Communication with Friends and Family:     Frequency of Social Gatherings with Friends and Family:     Attends Hindu Services:     Active Member of Clubs or Organizations:     Attends Club or Organization Meetings:     Marital Status:    Intimate Partner Violence:     Fear of Current or Ex-Partner:     Emotionally Abused:     Physically Abused:     Sexually Abused:        SCREENINGS   NIH Stroke Scale  NIH Stroke Scale Assessed: Yes  Interval: Baseline  Level of Consciousness (1a. ): Alert  LOC Questions (1b. ):  Answers neither question correctly  LOC Commands (1c. ): Performs neither task correctly  Best Gaze (2. ): Normal  Visual (3. ): No visual loss  Facial Palsy (4. ): Normal symmetrical movement  Motor Arm, Left (5a. ): No drift  Motor Arm, Right (5b. ): No drift  Motor Leg, Left (6a. ): No drift  Motor Leg, Right (6b. ): No drift  Limb Ataxia (7. ): Absent  Sensory (8. ): Normal  Best Language (9. ): Severe aphasia  Dysarthria (10. ): Normal  Extinction and Inattention (11): (!) Visual, tactile, auditory, spatial, or personal inattention  Total: 7  @FLOW([id number])@    PHYSICAL EXAM    (5+ for level 4, 8+ for level 5)     ED Triage Vitals [06/24/21 0123]   BP Temp Temp src Pulse Resp SpO2 Height Weight   (!) 189/86 99.1 °F (37.3 °C) -- 85 18 97 % 6' (1.829 m) 180 lb (81.6 kg)       Physical Exam  Vitals and nursing note reviewed. Constitutional:       General: He is not in acute distress. Appearance: Normal appearance. He is well-developed. He is not diaphoretic. HENT:      Head: Normocephalic and atraumatic. Nose: Nose normal. No congestion. Mouth/Throat:      Pharynx: No oropharyngeal exudate. Eyes:      General: No scleral icterus. Right eye: No discharge. Left eye: No discharge. Conjunctiva/sclera: Conjunctivae normal.      Pupils: Pupils are equal, round, and reactive to light. Neck:      Thyroid: No thyromegaly. Vascular: No JVD. Trachea: No tracheal deviation. Cardiovascular:      Rate and Rhythm: Normal rate and regular rhythm. Heart sounds: Normal heart sounds. No murmur heard. No gallop. Pulmonary:      Effort: Pulmonary effort is normal. No respiratory distress. Breath sounds: Normal breath sounds. No stridor. No wheezing or rales. Chest:      Chest wall: No tenderness. Abdominal:      General: There is no distension. Palpations: Abdomen is soft. There is no mass. Tenderness: There is no abdominal tenderness. There is no guarding or rebound.    Musculoskeletal:         General: No tenderness or deformity. Normal range of motion. Cervical back: Normal range of motion and neck supple. Lymphadenopathy:      Cervical: No cervical adenopathy. Skin:     General: Skin is warm and dry. Coloration: Skin is not pale. Findings: No erythema or rash. Neurological:      Mental Status: He is alert. He is disoriented. Cranial Nerves: No cranial nerve deficit. Motor: No abnormal muscle tone. Coordination: Coordination normal.      Comments: Patient does have both expressive and receptive aphasia. He seems to move all extremities but he does not have any focal deficits that I can determine. He cannot really answer any questions. His speech is not slurred but he cannot really answer any questions. DIAGNOSTIC RESULTS     EKG (Per Emergency Physician): This rhythm. First-degree AV block. Rate of 81. No acute ischemia    RADIOLOGY (Per EmergencyPhysician):   Sinus rhythm. First-degree AV block. No evidence of any acute changes    Interpretation per the Radiologist below, if available at the time of this note:  CT Head WO Contrast    Result Date: 6/24/2021  EXAMINATION: CT OF THE HEAD WITHOUT CONTRAST  6/24/2021 1:47 am TECHNIQUE: CT of the head was performed without the administration of intravenous contrast. Dose modulation, iterative reconstruction, and/or weight based adjustment of the mA/kV was utilized to reduce the radiation dose to as low as reasonably achievable. COMPARISON: None. HISTORY: ORDERING SYSTEM PROVIDED HISTORY: aphasia TECHNOLOGIST PROVIDED HISTORY: Reason for exam:->aphasia Has a \"code stroke\" or \"stroke alert\" been called? ->Yes Decision Support Exception - unselect if not a suspected or confirmed emergency medical condition->Emergency Medical Condition (MA) What reading provider will be dictating this exam?->[facility] FINDINGS: BRAIN/VENTRICLES: There is no acute intracranial hemorrhage, mass effect or midline shift. No abnormal extra-axial fluid collection. The gray-white differentiation is maintained without evidence of an acute infarct. There is no evidence of hydrocephalus. There is moderate cerebral atrophy. 1 ORBITS: The visualized portion of the orbits demonstrate no acute abnormality. SINUSES: The visualized paranasal sinuses and mastoid air cells demonstrate no acute abnormality. SOFT TISSUES/SKULL:  No acute abnormality of the visualized skull or soft tissues. No acute intracranial abnormality. XR CHEST PORTABLE    Result Date: 2021  EXAMINATION: ONE XRAY VIEW OF THE CHEST 2021 2:20 am COMPARISON: 2009 HISTORY: ORDERING SYSTEM PROVIDED HISTORY: weakness fever TECHNOLOGIST PROVIDED HISTORY: Reason for exam:->weakness fever What reading provider will be dictating this exam?->CRC FINDINGS: There is no cardiomegaly. There is no congestion, infiltrate, pleural effusion or pneumothorax. There is mild elevation of the left hemidiaphragm. No acute abnormality identified. CTA NECK W CONTRAST    Result Date: 2021  Patient MRN:  [id number] : 1931 Age: 80 years Gender: Male Order Date:  2021 1:55 AM EXAM: CTA NECK W CONTRAST, CTA HEAD W CONTRAST NUMBER OF IMAGES:  [id number] INDICATION:  aphasia aphasia Decision Support Exception - unselect if not a suspected or confirmed emergency medical condition->Emergency Medical Condition (MA) What reading provider will be dictating this exam?->[facility] COMPARISON: None Technique: Low-dose CT  acquisition technique included one of following options; 1 . Automated exposure control, 2. Adjustment of MA and or KV according to patient's size or 3. Use of iterative reconstruction. Contiguous spiral images were obtained in the axial plane, following the administration of intravenous contrast using CT angiographic protocol. Sagittal and coronal images were reconstructed from the axial plane acquisition. Additional MIP reconstructions were presented to aid in the interpretation of this study.  Images were obtained from the skull base cranially. There is mild calcified plaque identified in the vessels compatible with atherosclerotic disease. There is aortic arch and great vessels demonstrate mild atherosclerotic disease. The right carotid is abnormal. There is no evidence for hemodynamically significant stenosis at the level the proximal internal carotid artery. By NASCET criteria estimated stenosis is 50% or less The left carotid is abnormal. There is no evidence for hemodynamically significant stenosis at the level the proximal internal carotid artery. By NASCET criteria estimated stenosis is 50% or less The right vertebral artery is unremarkable. The left vertebral artery is unremarkable. The basilar artery is unremarkable. The middle cerebral arteries are unremarkable. The anterior cerebral arteries are unremarkable. The posterior cerebral arteries are unremarkable. 1.  Mild atherosclerotic disease . No large vessel occlusion identified 2. Estimated stenosis of the proximal right and left internal carotid artery by NASCET criteria is not hemodynamically significant This study was analyzed by the [employer]. ai algorithm. CT BRAIN PERFUSION    Result Date: 2021  Patient MRN: [id number] : 1931 Age:  80 years Gender: Male Order Date: 2021 1:55 AM Exam: CT BRAIN PERFUSION Number of Images: 333 views Indication:   aphasia aphasia Decision Support Exception - unselect if not a suspected or confirmed emergency medical condition->Emergency Medical Condition (MA) What reading provider will be dictating this exam?->[facility] Comparison: None. Findings: Perfusion images demonstrate symmetric blood volume Blood flow images demonstrate symmetric blood flow There is no significant ischemic penumbra identified. There is no significant core infarct identified. No significant ischemic penumbra identified This study was analyzed by the [employer]. ai algorithm.      CTA HEAD W CONTRAST    Result Date: 2021  Patient MRN:  [id number] : 1931 Age: 80 years Gender: Male Order Date:  2021 1:55 AM EXAM: CTA NECK W CONTRAST, CTA HEAD W CONTRAST NUMBER OF IMAGES:  [id number] INDICATION:  aphasia aphasia Decision Support Exception - unselect if not a suspected or confirmed emergency medical condition->Emergency Medical Condition (MA) What reading provider will be dictating this exam?->[facility] COMPARISON: None Technique: Low-dose CT  acquisition technique included one of following options; 1 . Automated exposure control, 2. Adjustment of MA and or KV according to patient's size or 3. Use of iterative reconstruction. Contiguous spiral images were obtained in the axial plane, following the administration of intravenous contrast using CT angiographic protocol. Sagittal and coronal images were reconstructed from the axial plane acquisition. Additional MIP reconstructions were presented to aid in the interpretation of this study. Images were obtained from the skull base cranially. There is mild calcified plaque identified in the vessels compatible with atherosclerotic disease. There is aortic arch and great vessels demonstrate mild atherosclerotic disease. The right carotid is abnormal. There is no evidence for hemodynamically significant stenosis at the level the proximal internal carotid artery. By NASCET criteria estimated stenosis is 50% or less The left carotid is abnormal. There is no evidence for hemodynamically significant stenosis at the level the proximal internal carotid artery. By NASCET criteria estimated stenosis is 50% or less The right vertebral artery is unremarkable. The left vertebral artery is unremarkable. The basilar artery is unremarkable. The middle cerebral arteries are unremarkable. The anterior cerebral arteries are unremarkable. The posterior cerebral arteries are unremarkable. 1.  Mild atherosclerotic disease . No large vessel occlusion identified 2.  Estimated stenosis of the proximal right and left internal carotid artery by NASCET criteria is not hemodynamically significant This study was analyzed by the [employer] ai algorithm.       :  Labs Reviewed   COMPREHENSIVE METABOLIC PANEL W/ REFLEX TO MG FOR LOW K - Abnormal; Notable for the following components:       Result Value    Glucose 105 (*)     All other components within normal limits   CBC WITH AUTO DIFFERENTIAL - Abnormal; Notable for the following components:    Lymphocytes % 14.0 (*)     Lymphocytes Absolute 0.94 (*)     Eosinophils Absolute 0.04 (*)     All other components within normal limits   PROTIME-INR - Abnormal; Notable for the following components:    Protime 13.9 (*)     All other components within normal limits   APTT - Abnormal; Notable for the following components:    aPTT 21.6 (*)     All other components within normal limits   POCT GLUCOSE - Abnormal; Notable for the following components:    Meter Glucose 101 (*)     All other components within normal limits   CULTURE, BLOOD 1   CULTURE, BLOOD 2   URINALYSIS   LACTATE, SEPSIS   MICROSCOPIC URINALYSIS   LACTATE, SEPSIS       All other labs were within normal range or not returned as of this dictation.     EMERGENCY DEPARTMENT COURSE and DIFFERENTIALDIAGNOSIS/MDM:   Vitals:    Vitals:    06/24/21 0123 06/24/21 0152   BP: (!) 189/86 (!) 178/84   Pulse: 85 85   Resp: 18 21   Temp: 99.1 °F (37.3 °C)    SpO2: 97% 96%   Weight: 180 lb (81.6 kg)    Height: 6' (1.829 m)        Medications   aspirin chewable tablet 324 mg (has no administration in time range)   sodium chloride flush 0.9 % injection 5-40 mL (has no administration in time range)   sodium chloride flush 0.9 % injection 5-40 mL (has no administration in time range)   0.9 % sodium chloride infusion (has no administration in time range)   enoxaparin (LOVENOX) injection 40 mg (has no administration in time range)   acetaminophen (TYLENOL) tablet 650 mg (has no administration in time range)   ondansetron (ZOFRAN-ODT) disintegrating tablet 4 mg (has no administration in time range)     Or   ondansetron (ZOFRAN) injection 4 mg (has no administration in time range)   iopamidol (ISOVUE-370) 76 % injection 100 mL (100 mLs Intravenous Given 6/24/21 0201)       MDM  Number of Diagnoses or Management Options  Diagnosis management comments: Presented here with aphasia probably starting at 9 PM.  He also was complaining of a headache. NIH was 7 but some of the components were very difficult to evaluate. She was made a stroke team and sent for CTs, CTAs and perfusion studies. I spoke with Dr. Bernice Nowak who reviewed these and did not feel that there was any large vessel occlusion or anything amenable to intervention. Also since his symptoms had been ongoing for 5 hours it was not felt that he was a candidate for TPA. Patient's other labs were unremarkable and there was no evidence of any acute infection. Per Dr. Bernice Nowak the patient was given an aspirin. I also spoke with the patient's PCP Dr. Lorin Enrique. Patient admitted to his service. Patient remained stable neurologically with no new changes. .  Critical care 28 minutes for assessment and evaluation of acute neurologic symptoms    CONSULTS:  IP CONSULT TO PHARMACY  PHARMACY TO CHANGE BASE FLUIDS  IP CONSULT TO INTERNAL MEDICINE    PROCEDURES:  Unless otherwise noted below, none     Procedures    FINAL IMPRESSION      1. Acute intractable headache, unspecified headache type    2. Aphasia        DISPOSITION/PLAN   DISPOSITION Decision To Admit 06/24/2021 03:11:10 AM      PATIENT REFERRED TO:  No follow-up provider specified. DISCHARGE MEDICATIONS:  New Prescriptions    No medications on file          (Please note:  Portions of this note were completed with a voice recognition program.  Efforts were made to edit thedictations but occasionally words and phrases are mis-transcribed.)    Form v2016. J.[id number]    Pilar Lopez MD (electronically signed)  Emergency Medicine Provider         Pilar Lopez MD  06/24/21 9220

## 2021-06-24 NOTE — PROGRESS NOTES
[address]      Date:2021                                                  Patient Name: Monisha Sotomayor  MRN: [id number]  : 1931  Room: [address] [place] [address]    Evaluating OT: LISA Vázquez, OTR/L  # [id number]    Referring Provider:  Maximino Spangler MD  Specific Provider Orders:  Kanchan Mendoza and Treat\"21    Diagnosis: r/o Stroke  1. Acute intractable headache, unspecified headache type    2.  Aphasia         Surgeries this admission: None     Pertinent Medical History: OA, Radiculopathy of Cervical Spine      Precautions:  Fall Risk  Moderate Expressive Aphasia  [place]  Cognition/Bed Alarm    Appears to have John LEs weakness/foot drop - see PT eval    Assessment of current deficits   [x] Functional mobility   [x]ADLs  [x] Strength               [x]Cognition   [x] Functional transfers   [x] IADLs         [x] Safety Awareness   [x]Endurance   [] Fine Coordination              [x] Balance      [] Vision/perception   []Sensation    []Gross Motor Coordination  [] ROM  [] Delirium                   [] Motor Control       OT PLAN OF CARE   OT POC based on physician orders, patient diagnosis and results of clinical assessment    Frequency/Duration 1-3 days/wk for 2 weeks PRN   Specific OT Treatment to include:   * Instruction/training on adapted ADL techniques and AE recommendations to increase functional independence within precautions       * Training on energy conservation strategies, correct breathing pattern and techniques to improve independence/tolerance for self-care routine  * Functional transfer/mobility training/DME recommendations for increased independence, safety, and fall prevention  * Patient/Family education to increase follow through with safety techniques and functional independence  * Recommendation of environmental modifications for increased safety with functional transfers/mobility and ADLs  * Cognitive retraining/development of therapeutic activities to improve problem solving, judgement, memory, and attention for increased safety/participation in ADL/IADL tasks  * Sensory re-education to improve body/limb awareness, maintain/improve skin integrity, and improve hand/UE motor function  * Visual-perceptual training to improve environmental scanning, visual attention/focus, and oculomotor skills for increased safety/independence with functional transfers/mobility and ADLs  * Splinting/positioning for increased function, prevention of contractures, and improve skin integrity  * Therapeutic exercise to improve motor endurance, ROM, and functional strength for ADLs/functional transfers  * Therapeutic activities to facilitate/challenge dynamic balance, stand tolerance for increased safety and independence with ADLs  * Therapeutic activities to facilitate gross/fine motor skills for increased independence with ADLs  * Neuro-muscular re-education: facilitation of righting/equilibrium reactions, midline orientation, scapular stability/mobility, normalization of muscle tone, and facilitation of volitional active controled movement  * Positioning to improve skin integrity, interaction with environment and functional independence  * Delirium prevention/treatment    Modified Windsor Scale (MRS)  Score     Description  0             No symptoms  1             No significant disability despite symptoms  2             Slight disability; able to look after own affairs  3             Moderate disability; able to ambulate without assist/ requires assist with ADLs  4             Moderate/Severe disability;requires assist to ambulate/assist with ADLs  5             Severe disability;bedridden/incontinent   6               Score:   4    Pt was admitted w/ Difficulty speaking, HA.       Recommended Adaptive Equipment: TBD as pt progresses       Pt was a Fair historian - Moderate Expressive Aphasia - no family present during interview:    Home Living:  Pt lives with his wife in a single-level house/condo, No Basement. Bathroom setup:  Tub-Shower, Standard-height Commode   Equipment [facility]:  [facility]    Available Family Assist:  Wife can provide assist PRN - uncertain of her ability to safely provide physical assist    Prior Level of Function:  Per Pt, he was IND with all ADLs, IADLs, Transfers and Mobility using SPC for ambulation. Driving:  Yes  Occupation:  Retired     Pain Level:  Denied pain, HA    Additional Complaints:  Continued difficulty w/ speaking, John LE weakness;  Dizziness w/ change in position    Cognition: A & O x 2 - pt was able to state his full name, . Mod VCs/extended time to ID current month, year and place   Able to Follow Multi-Step Commands w/ Mod VCs/extended time to process task   Memory:  fair (-)   Sequencing:  fair (-)   Problem solving:  fair (-)   Judgement/safety:  fair (-)  Additional Comments:  Pt was pleasant and cooperative.   No Agitation - Frustrated w/ Aphasia, Weakness, unsteadiness    Vitals/Lab Values:  WFL room air        Functional Assessment:  AM-PAC Daily Activity Raw Score: 15/24     Initial Eval Status  Date: 21   Treatment Status  Date: STGs = LTGs  Time frame: 10-14 days   Feeding Set up    Able to retrieve cup from table, drink from cup w/o Difficulty    NA   Grooming SUP/Min VCs    Able to wash hands/face w/ Cloht after set up seated EOB, Min VCs to problem solve task  Unsafe/Unable to tolerate ambulating to or standing at sink    SUP/Set up  Standing at the [employer] Mod A    Mod A + Mod VCs to problem solve task of donning robe after set up seated EOB, unsafe for item retrieval, extended time    SUP/Set up     LB Dressing Mod A    SUP/Min VCs for safety/problem solving to don/doff socks w/ cross-legged tech seated EOB, Mod A for simulated clothing adjustment over hips + Mod A for dynamic standing balance + Mod  VCs for safety/adaptive techs/problem solving, extended time    Min A     Bathing NT    Min A      Toileting NT    Pt declined     Min A     Bed Mobility  Supine to sit: Min A   Sit to supine:  Min A     Min A + Min VCs to problem solve task + extended time    Supine to sit: SUP  Sit to supine: SUP     Functional Transfers Mod A    Mod A + Mod VCs for safety/hand placement to stand from EOB    Min A     Functional Mobility Mod A    Mod A + Mod VCs  VCs for safety/improved safety awareness, walker safety w/ WW 1-2 side-steps along EOB, moderately unsteady    Min A     Balance Sitting:     Static:  SUP EOB    Dynamic:  Close SUP w/ functional ax unsupported EOB    Standing:     Static:  Mod A w/ [employer]    Dynamic:  Mod-Max A w/ functional ax/mobility w/ [employer]       Activity Tolerance Fair    Sitting Tolerance:  ~ 20 mins EOB w/ functional ax  Standing Tolerance:  ~ 2 mins w/ [employer]    Good(-)   Visual/  Perceptual    Hearing: WFL - described chronic blurred vision; No Visual Neglect noted, Scanning/Tracking WFL  Glasses: Yes - not present at b/s - able to read orientation board    WFL/Somewhat [place]  Hearing Aids:  No               Hand Dominance: Right   AROM Strength Additional Info:    RUE  WFL    Mod VCs/tactile cues/visual cues to follow commands for MMT/ROM 4+/5 Good ;   Good FMC/dexterity noted during ADL tasks     LUE WFL 4+/5 Good ;    Good FMC/dexterity noted during ADL tasks       Rapid Alternating Movements:  WNL John UEs  Finger-Nose:  WFL John UEs - limited following commands for task rather than lack of coordination     Sensation:  Denies numbness or tingling John UEs   Tone: WFL John UEs   Edema: None Noted John UEs     Comments: Upon arrival, patient was found in supine. He was agreeable to participate in therapeutic ax. No Family present during session. Received permission from RN prior to engaging pt in OT services. At the end of the session, patient was properly positioned in Semi-Supine.   Call light and phone within reach, all lines and tubes intact. Oriented pt to call bell. Made all appropriate Environmental Modifications to facilitate pt's level of IND and safety. All needs met. Bed Alarm activated. Overall patient demonstrated decreased independence and safety during completion of ADL/functional transfer/mobility tasks. Pt would benefit from continued skilled OT to increase safety and independence with completion of ADL/IADL tasks for functional independence and quality of life. Treatment: OT treatment provided this date includes:    Instruction/training on safety and adapted techniques for completion of ADLs, use of DME/AD/Adaptive equip:     Instruction/training on safe functional mobility/transfer techniques, use of DME/AD:     Instruction/training on energy conservation techs (EC)/Pursed-Lip Breathing (PLB)/work simplification for completion of ADLs:      Neuromuscular Reeducation to facilitate balance/righting reactions for increased function with ADLs:     Neuromuscular Facilitation of  UE functional movement/ROM/fine motor dexterity     Facilitation of Visual Perceptual Skills for increased safety and independence with ADLs.   Skilled positioning/alignment for Pain Mgmt, Skin Integrity, Edema Control, to maximize Pt's ability to [facility] interact w/ his/her environment, maximize respiratory status   Activity tolerance - Sitting/Standing to improve endurance w/ functional ax    Cognitive retraining -  Oriented pt to current Date, Place and Situation; Cues for safety/safety awareness, sequencing, problem solving     Skilled monitoring of pt's response to tx ax        Consulted RN     Made all appropriate Environmental Modifications to facilitate pt's level of IND and safety.  Recommendations for Continued Participation in OT services during Hospitalization and at D/C - Acute Rehab    Pt and/or Family verbalized/demonstrated a Fair understanding of education provided.   Will Review PRN. Rehab Potential: Good(-) for established goals     Patient / Family Goal: Not stated at this time      Patient and/or family were instructed on functional diagnosis, prognosis/goals and OT plan of care. Demonstrated Fair understanding. Eval Complexity: Low    Time In: 1438  Time Out: 1521  Total Treatment Time: 28 minutes    Min Units   OT Eval Low 97165  X 1   OT Eval Medium 44203      OT Eval High 39487      OT Re-Eval [id number]       Therapeutic Ex 68842       Therapeutic Activities 95579       ADL/Self Care 61183  28  2   Orthotic Management 17165       Manual [id number]     Neuro Re-Ed 51751       Non-Billable Time              Evaluation Time additionally includes thorough review of current medical information, gathering information on past medical history/social history and prior level of function, completion of standardized testing/informal observation of tasks, assessment of data and education on plan of care and goals.             Hortencia Marin, MOT, OTR/L  # [id number]

## 2021-06-24 NOTE — VIRTUAL HEALTH
TeleStroke Brief note     Patient with waxing and waning dysarthria onset 4.5 hours ago did become better but came back  Headache for 4 days   Ct head negative  Cta negative for lvo   Ctp no perfusion deficit     Assessment   Stroke like symptoms    Recommendations   Mri brain, neurology consult   Outside the time window for tpa , no perfusion deficit for extend criteria   No lvo hence not an endovascular can didate     Discussed with Ed Physician    Time spent medical decision making including review of the images =35 min

## 2021-06-24 NOTE — ED NOTES
Pt attempted to get out of bed to use urinal. Pt was unsteady. RNx3 assist to use urinal. Dry sheets applied and pt changed into hospital gown.  Pt placed back into bed     Fabiana Harrell RN  06/24/21 8796

## 2021-06-24 NOTE — CONSULTS
[facility]  Neurology Consult    Date:  6/24/2021  Patient Name:  Eddy Acosta  YOB: 1931  MRN: [id number]     PCP:  Syed Luo MD   Referring:  No ref. provider found      Chief Complaint: aphasia, headache    History obtained from: chart    Assessment  Eddy Acosta is a 80 y.o. male admitted with new onset aphasia and headache. Concern for acute ischemic stroke in the left MCA territory. Will also investigate for GCA given new onset headache, though, can be seen with stroke as well. Plan  · ESR, CRP  · MRI brain w/o contrast  · Statin therapy, goal LDL<70  · Echo  · ASA+Plavix x 21 days followed by ASA monotherapy  · EEG        History of Present Illness:  Eddy Acosta is a 80 y.o. right handed male presenting for evaluation of aphasia. The patient is unable to provide any significant history. He does indicate he has a headache at this time. As per the chart the patient had been having headache for the past several days. At 9pm 6/23 his symptoms apparently worsened with language difficulty and he had seemed confused since then.       Review of Systems:  Unable to obtain due to aphasia      Medical History:   Past Medical History:   Diagnosis Date    Acid reflux     BPH (benign prostatic hyperplasia)     Cancer (HCC)     skin--right cheek, treated with radiation    Diverticulosis     Hearing loss     Hiatal hernia     Hyperlipidemia     Osteoarthritis     Radiculopathy of cervical spine         Surgical History:   Past Surgical History:   Procedure Laterality Date    CATARACT REMOVAL WITH IMPLANT      right , left    CYSTOSCOPY  age 62    for prostate    HERNIA REPAIR      right and left inguinal    LIPOMA RESECTION      right arm, left foot    OTHER SURGICAL HISTORY  8/7/2012    cysto, bilat retrogrades, pyelogram, plasma button TURP    SKIN CANCER EXCISION      right cheek    TONSILLECTOMY          Family History:   Family History   Problem Relation Age of Onset    Cancer Mother     Cancer Father        Social History:  Social History     Tobacco Use    Smoking status: Never Smoker    Smokeless tobacco: Never Used   Substance Use Topics    Alcohol use: Yes     Comment: social    Drug use: No        Current Medications:      Current Facility-Administered Medications   Medication Dose Route Frequency Provider Last Rate Last Admin    sodium chloride flush 0.9 % injection 5-40 mL  5-40 mL Intravenous 2 times per day Filipe Marley MD   10 mL at 06/24/21 1252    sodium chloride flush 0.9 % injection 5-40 mL  5-40 mL Intravenous PRN Filipe Marley MD        0.9 % sodium chloride infusion  25 mL Intravenous PRN Filipe Marley MD        enoxaparin (LOVENOX) injection 40 mg  40 mg Subcutaneous Daily Filipe Marley MD   40 mg at 06/24/21 1019    ondansetron (ZOFRAN-ODT) disintegrating tablet 4 mg  4 mg Oral Q8H PRN Filipe Marley MD   4 mg at 06/24/21 1327    Or    ondansetron (ZOFRAN) injection 4 mg  4 mg Intravenous Q6H PRN Filipe Marley MD        Vitamin D (CHOLECALCIFEROL) tablet 1,000 Units  1,000 Units Oral Daily Maximino Spangler MD   1,000 Units at 06/24/21 1250    meclizine (ANTIVERT) tablet 12.5 mg  12.5 mg Oral TID PRN Maximino Spangler MD        pantoprazole (PROTONIX) tablet 40 mg  40 mg Oral QAM AC Luke Stockton MD   40 mg at 06/24/21 1254    sodium chloride flush 0.9 % injection 5-40 mL  5-40 mL Intravenous 2 times per day Maximino Spangler MD   10 mL at 06/24/21 1251    sodium chloride flush 0.9 % injection 5-40 mL  5-40 mL Intravenous PRN Maximino Spangler MD        0.9 % sodium chloride infusion  25 mL Intravenous PRN Maximino Spangler MD        polyethylene glycol (GLYCOLAX) packet 17 g  17 g Oral Daily PRN Maximino Spangler MD        acetaminophen (TYLENOL) tablet 650 mg  650 mg Oral Q6H PRN Maximino Spangler MD   650 mg at 06/24/21 1019    Or    acetaminophen (TYLENOL) suppository 650 mg  650 mg Rectal Q6H PRN Maximino Spangler MD        hydrALAZINE (APRESOLINE) injection 10 mg  10 mg Intravenous Q4H PRN Leah May MD   10 mg at 06/24/21 0642        Allergies:      No Known Allergies     Physical Examination  Vitals   Vitals:    06/24/21 0152 06/24/21 0634 06/24/21 0728 06/24/21 0915   BP: (!) 178/84 (!) 156/78 (!) 155/70 126/65   Pulse: 85 89 94 86   Resp: 21 16 18 16   Temp:  99.1 °F (37.3 °C) 97.9 °F (36.6 °C) 98.6 °F (37 °C)   TempSrc:  Oral Temporal Oral   SpO2: 96% 95% 95% 95%   Weight:       Height:            General: Patient appears in no acute distress with a normal body habitus  HEENT: Normocephalic, atraumatic  Chest: no respiratory distress noted  Extremities: No edema or cyanosis noted    Neurologic Examination    Mental Status  Awake and alert. Patient able to name simple items, more difficulty with lower frequency words. Difficulty with following commands. Attention fair. Cranial Nerves  II. Visual fields full to confrontation bilaterally. III, IV, VI: Pupils equally round and reactive to light, 3 to 2 mm bilaterally. EOMs: full, no nystagmus. V. Facial sensation intact to light touch bilaterally  VII: Facial movements symmetric and strong  VIII: Hearing intact to voice  IX,X: Palate elevates symmetrically. Mild dysarthria  XI: Sternocleidomastoid and trapezius 5/5 bilaterally   XII: Tongue is midline    Motor     Right Left   Right Left   Deltoid 5 5  Hip Flexion 5 5   Biceps      5  5  Knee Extension 5 5   Triceps 5 5  Knee Flexion 5 5   Handgrip 5 5  Ankle Dorsiflexion 5 5       Ankle Plantarflexion 5 5       Sensation  · Light Touch: Intact distally in all four limbs    Reflexes     Right Left   Biceps 2 2   Brachioradialis 2 2   Patellar 1 1   Achilles 1 1   ankle clonus none none     Toes down going bilaterally. Coordination  Rapid alternating movements normal in bilateral upper extremities    Gait  Deferred for safety concerns      Labs  Results for Solmon Purchase (MRN [id number]) as of 6/24/2021 17:33   Ref. Range 6/24/2021 01:42   Sodium Latest Ref Range: 132 - 146 mmol/L 140   Potassium Latest Ref Range: 3.5 - 5.0 mmol/L 3.8   Chloride Latest Ref Range: 98 - 107 mmol/L 101   CO2 Latest Ref Range: 22 - 29 mmol/L 27   BUN Latest Ref Range: 6 - 23 mg/dL 20   Creatinine Latest Ref Range: 0.7 - 1.2 mg/dL 1.2   Anion Gap Latest Ref Range: 7 - 16 mmol/L 12   GFR Non- Latest Ref Range: >=60 mL/min/1.73 57   GFR  Unknown >60   Lactic Acid, Sepsis Latest Ref Range: 0.5 - 1.9 mmol/L 1.1   Glucose Latest Ref Range: 74 - 99 mg/dL 105 (H)   Calcium Latest Ref Range: 8.6 - 10.2 mg/dL 9.4   Total Protein Latest Ref Range: 6.4 - 8.3 g/dL 6.6   Cholesterol, Total Latest Ref Range: 0 - 199 mg/dL 168   HDL Cholesterol Latest Ref Range: >40 mg/dL 42   LDL Calculated Latest Ref Range: 0 - 99 mg/dL 111 (H)   Triglycerides Latest Ref Range: 0 - 149 mg/dL 73   VLDL Cholesterol Calculated Latest Units: mg/dL 15   Albumin Latest Ref Range: 3.5 - 5.2 g/dL 4.3   Alk Phos Latest Ref Range: 40 - 129 U/L 68   ALT Latest Ref Range: 0 - 40 U/L 17   AST Latest Ref Range: 0 - 39 U/L 15   Bilirubin Latest Ref Range: 0.0 - 1.2 mg/dL 0.5   Hemoglobin A1C Latest Ref Range: 4.0 - 5.6 % 4.7   WBC Latest Ref Range: 4.5 - 11.5 E9/L 6.7   RBC Latest Ref Range: 3.80 - 5.80 E12/L 4.00   Hemoglobin Quant Latest Ref Range: 12.5 - 16.5 g/dL 13.3   Hematocrit Latest Ref Range: 37.0 - 54.0 % 38.6   MCV Latest Ref Range: 80.0 - 99.9 fL 96.5   MCH Latest Ref Range: 26.0 - 35.0 pg 33.3   MCHC Latest Ref Range: 32.0 - 34.5 % 34.5   MPV Latest Ref Range: 7.0 - 12.0 fL 9.4   RDW Latest Ref Range: 11.5 - 15.0 fL 12.8   Platelet Count Latest Ref Range: 130 - 450 E9/L 245   Neutrophils % Latest Ref Range: 43.0 - 80.0 % 76.3   Immature Granulocytes % Latest Ref Range: 0.0 - 5.0 % 0.3   Lymphocyte % Latest Ref Range: 20.0 - 42.0 % 14.0 (L)   Monocytes % Latest Ref Range: 2.0 - 12.0 % 8.5   Eosinophils % Latest Ref Range: 0.0 - 6.0 % 0.6   Basophils % Latest Ref Range: 0.0 - 2.0 % 0.3   Neutrophils Absolute Latest Ref Range: 1.80 - 7.30 E9/L 5.11   Immature Granulocytes # Latest Units: E9/L 0.02   Lymphocytes Absolute Latest Ref Range: 1.50 - 4.00 E9/L 0.94 (L)   Monocytes Absolute Latest Ref Range: 0.10 - 0.95 E9/L 0.57   Eosinophils Absolute Latest Ref Range: 0.05 - 0.50 E9/L 0.04 (L)   Basophils Absolute Latest Ref Range: 0.00 - 0.20 E9/L 0.02   Prothrombin Time Latest Ref Range: 9.3 - 12.4 sec 13.9 (H)   INR Unknown 1.3   aPTT Latest Ref Range: 24.5 - 35.1 sec 21.6 (L)   CULTURE, BLOOD 1 Unknown Rpt   CULTURE, BLOOD 2 Unknown Rpt   Color, UA Latest Ref Range: Straw/Yellow  Yellow   Clarity, UA Latest Ref Range: Clear  Clear   Glucose, UA Latest Ref Range: Negative mg/dL Negative   Bilirubin, Urine Latest Ref Range: Negative  Negative   Ketones, Urine Latest Ref Range: Negative mg/dL Negative   Specific Gravity, UA Latest Ref Range: 1.005 - 1.030  1.020   Blood, Urine Latest Ref Range: Negative  Negative   pH, UA Latest Ref Range: 5.0 - 9.0  8.0   Protein, UA Latest Ref Range: Negative mg/dL TRACE   Urobilinogen, Urine Latest Ref Range: <2.0 E.U./dL 1.0   Nitrite, Urine Latest Ref Range: Negative  Negative   Leukocyte Esterase, Urine Latest Ref Range: Negative  Negative   WBC, UA Latest Ref Range: 0 - 5 /HPF 0-1   RBC, UA Latest Ref Range: 0 - 2 /HPF 0-1   Bacteria, UA Latest Ref Range: None Seen /HPF NONE SEEN       Imaging  CTA head/neck     Impression   1.  Mild atherosclerotic disease . No large vessel occlusion   identified   2. Estimated stenosis of the proximal right and left internal carotid   artery by NASCET criteria is not hemodynamically significant       CT brain perfusion     Impression       No significant ischemic penumbra identified       This study was analyzed by the Viz[employer] ai algorithm. CT head w/o contrast     Impression   No acute intracranial abnormality.          Electronically signed by: Marylu Hernandez DO, 6/24/2021 2:19 PM

## 2021-06-25 ENCOUNTER — APPOINTMENT (OUTPATIENT)
Dept: NEUROLOGY | Age: 86
DRG: 103 | End: 2021-06-25
Payer: MEDICARE

## 2021-06-25 ENCOUNTER — APPOINTMENT (OUTPATIENT)
Dept: MRI IMAGING | Age: 86
DRG: 103 | End: 2021-06-25
Payer: MEDICARE

## 2021-06-25 PROBLEM — R51.9 NEW ONSET OF HEADACHES AFTER AGE 50: Status: ACTIVE | Noted: 2021-06-25

## 2021-06-25 PROBLEM — R41.82 ALTERED MENTAL STATUS: Status: ACTIVE | Noted: 2021-06-25

## 2021-06-25 LAB — C-REACTIVE PROTEIN: 0.3 MG/DL (ref 0–0.4)

## 2021-06-25 PROCEDURE — 99233 SBSQ HOSP IP/OBS HIGH 50: CPT | Performed by: NURSE PRACTITIONER

## 2021-06-25 PROCEDURE — 6370000000 HC RX 637 (ALT 250 FOR IP): Performed by: PSYCHIATRY & NEUROLOGY

## 2021-06-25 PROCEDURE — 95816 EEG AWAKE AND DROWSY: CPT

## 2021-06-25 PROCEDURE — A9579 GAD-BASE MR CONTRAST NOS,1ML: HCPCS | Performed by: RADIOLOGY

## 2021-06-25 PROCEDURE — 2580000003 HC RX 258: Performed by: INTERNAL MEDICINE

## 2021-06-25 PROCEDURE — 70553 MRI BRAIN STEM W/O & W/DYE: CPT

## 2021-06-25 PROCEDURE — 2580000003 HC RX 258: Performed by: EMERGENCY MEDICINE

## 2021-06-25 PROCEDURE — 97530 THERAPEUTIC ACTIVITIES: CPT

## 2021-06-25 PROCEDURE — 6360000002 HC RX W HCPCS: Performed by: EMERGENCY MEDICINE

## 2021-06-25 PROCEDURE — 95816 EEG AWAKE AND DROWSY: CPT | Performed by: PSYCHIATRY & NEUROLOGY

## 2021-06-25 PROCEDURE — 97161 PT EVAL LOW COMPLEX 20 MIN: CPT

## 2021-06-25 PROCEDURE — 2060000000 HC ICU INTERMEDIATE R&B

## 2021-06-25 PROCEDURE — 6360000004 HC RX CONTRAST MEDICATION: Performed by: RADIOLOGY

## 2021-06-25 PROCEDURE — 97535 SELF CARE MNGMENT TRAINING: CPT

## 2021-06-25 PROCEDURE — 6370000000 HC RX 637 (ALT 250 FOR IP): Performed by: INTERNAL MEDICINE

## 2021-06-25 RX ORDER — BUTALBITAL, ACETAMINOPHEN AND CAFFEINE 50; 325; 40 MG/1; MG/1; MG/1
1 TABLET ORAL EVERY 4 HOURS PRN
Status: DISCONTINUED | OUTPATIENT
Start: 2021-06-25 | End: 2021-06-30 | Stop reason: HOSPADM

## 2021-06-25 RX ORDER — TOPIRAMATE 25 MG/1
25 TABLET ORAL 2 TIMES DAILY
Status: DISCONTINUED | OUTPATIENT
Start: 2021-06-25 | End: 2021-06-30 | Stop reason: HOSPADM

## 2021-06-25 RX ADMIN — ATORVASTATIN CALCIUM 40 MG: 40 TABLET, FILM COATED ORAL at 20:55

## 2021-06-25 RX ADMIN — PANTOPRAZOLE SODIUM 40 MG: 40 TABLET, DELAYED RELEASE ORAL at 05:42

## 2021-06-25 RX ADMIN — Medication 1000 UNITS: at 09:16

## 2021-06-25 RX ADMIN — CLOPIDOGREL 75 MG: 75 TABLET, FILM COATED ORAL at 09:17

## 2021-06-25 RX ADMIN — Medication 10 ML: at 09:17

## 2021-06-25 RX ADMIN — GADOTERIDOL 17 ML: 279.3 INJECTION, SOLUTION INTRAVENOUS at 07:35

## 2021-06-25 RX ADMIN — BUTALBITAL, ACETAMINOPHEN, AND CAFFEINE 1 TABLET: 50; 325; 40 TABLET ORAL at 09:27

## 2021-06-25 RX ADMIN — Medication 10 ML: at 20:55

## 2021-06-25 RX ADMIN — BUTALBITAL, ACETAMINOPHEN, AND CAFFEINE 1 TABLET: 50; 325; 40 TABLET ORAL at 15:03

## 2021-06-25 RX ADMIN — TOPIRAMATE 25 MG: 25 TABLET, FILM COATED ORAL at 20:55

## 2021-06-25 RX ADMIN — ASPIRIN 81 MG CHEWABLE TABLET 81 MG: 81 TABLET CHEWABLE at 09:17

## 2021-06-25 RX ADMIN — Medication 10 ML: at 09:19

## 2021-06-25 RX ADMIN — ENOXAPARIN SODIUM 40 MG: 40 INJECTION SUBCUTANEOUS at 09:17

## 2021-06-25 RX ADMIN — TOPIRAMATE 25 MG: 25 TABLET, FILM COATED ORAL at 09:17

## 2021-06-25 RX ADMIN — ACETAMINOPHEN 650 MG: 325 TABLET ORAL at 05:41

## 2021-06-25 ASSESSMENT — PAIN DESCRIPTION - LOCATION: LOCATION: HEAD

## 2021-06-25 ASSESSMENT — PAIN SCALES - GENERAL
PAINLEVEL_OUTOF10: 5
PAINLEVEL_OUTOF10: 4
PAINLEVEL_OUTOF10: 3
PAINLEVEL_OUTOF10: 0
PAINLEVEL_OUTOF10: 0

## 2021-06-25 ASSESSMENT — PAIN DESCRIPTION - PAIN TYPE: TYPE: CHRONIC PAIN

## 2021-06-25 NOTE — CARE COORDINATION
Met with patient and wife at bedside. Lives with wife in a condo, 1 step to enter. Patient independent with ADLs and iADLs, uses a cane or rollator for ambulation. PCP is Clara Walters MD. Patient plans to discharge home with his wife. MRI pending. Spoke to patient and wife about rehab; patient interested in ARU. [facility] list provided in case ARU is not an option. Will review for choices on Monday.     [facility] [phone]

## 2021-06-25 NOTE — PROGRESS NOTES
AROM at EOB    Patient education  Pt educated on PT role, safety during functional mobility, AAD usage. Patient response to education:   Pt verbalized understanding Pt demonstrated skill Pt requires further education in this area   yes yes Reinforced      ASSESSMENT:    Conditions Requiring Skilled Therapeutic Intervention:    [x]Decreased strength     []Decreased ROM  [x]Decreased functional mobility  [x]Decreased balance   [x]Decreased endurance   []Decreased posture  [x]Decreased sensation  [x]Decreased coordination   []Decreased vision  [x]Decreased safety awareness   []Increased pain       Comments:  Pt received in supine and agreeable to PT evaluation. Vitals monitored during session. Pt is aphasic and required increased time to answers questions. Pt able to get to EOB without assistance. Pt required VC to push from chair with sit > stand. Pt did not demonstrate skill in this and continued to required VC for this throughout session. Pt has B foot drop and compensates with increased hip and knee flexion during ambulation. Pt required assistance with FWW negotiation and demonstrated unsteadiness with turning. Pt agreed to sitting up in chair at end of session. Pt required VC to reach back for chair when performing stand > sit. Pt requires use of chair alarm because pt is forgetful of instructions. Pt left in chair with call button in reach, lines attached, and needs met. Pt would benefit from intensive PT services at discharge. Notified SW.      Treatment:  Patient practiced and was instructed in the following treatment:     Sitting EOB for >5 minutes for upright tolerance, postural awareness and BLE ROM   STS and pivot transfer training - pt educated on proper hand and foot placement, safety and sequencing, and use of FWW to safely complete sit<>stand and pivot transfers with hands on assistance to complete task safely    Gait training- pt was given verbal and tactile cues to facilitate FWW negotiation/approximation during ambulation as well as provided with physical assistance to complete task. Pt's/ family goals   1. Return home vs rehab     Prognosis is fair for reaching above PT goals. Patient and or family understand(s) diagnosis, prognosis, and plan of care. Yes ? PHYSICAL THERAPY PLAN OF CARE:    PT POC is established based on physician order and patient diagnosis     Referring provider/PT Order:    06/24/21 0730, PT evaluation and treat (Order #[id number]) on 6/24/21 ONE TIME, Standing Count: 1 Occurrences, R      Luke Stockton MD       Diagnosis:  Aphasia [R47.01]  Specific instructions for next treatment:  Progress ambulation     Current Treatment Recommendations:     [x] Strengthening to improve independence with functional mobility   [] ROM to improve independence with functional mobility   [x] Balance Training to improve static/dynamic balance and to reduce fall risk  [x] Endurance Training to improve activity tolerance during functional mobility   [x] Transfer Training to improve safety and independence with all functional transfers   [x] Gait Training to improve gait mechanics, endurance and asses need for appropriate assistive device  [] Stair Training in preparation for safe discharge home and/or into the community   [x] Positioning to prevent skin breakdown and contractures  [x] Safety and Education Training   [x] Patient/Caregiver Education   [] HEP  [] Other     PT long term treatment goals are located in above grid    Frequency of treatments: 2-5x/week x 1-2 weeks. Time in  0805  Time out  0827    Total Treatment Time  8 minutes     Evaluation Time includes thorough review of current medical information, gathering information on past medical history/social history and prior level of function, completion of standardized testing/informal observation of tasks, assessment of data and education on plan of care and goals.     CPT codes:  [x] Low Complexity PT evaluation

## 2021-06-25 NOTE — PROGRESS NOTES
Luna Laurent is a 80 y.o. right handed male     Neuro is following for AMS    PMH: HLD, cervical radiculopathy    Presented with aphasia--had HA the past several days followed by worsening confusion and language issues leading to admission. Questionable area of abnormal perfusion in left parietal lobe seen on T-max but not appreciated by radiology    Personal review of contrasted MRI brain showed no clear evidence of stroke or bleeding. Sed and CRP normal.  EEG is pending. He is sitting up in the chair and notes a continued occipital, pressure type headache of a severity of 5/10, associated with mild nausea but no photophobia or phonophobia. No known triggers to these headaches, which began a few days prior. When his headaches get worse he feels foggy \"like I am dreaming\" but denies any blurred or double vision. Headaches do worsen with activity but are not provoked by positional or exertional movements. No autonomic signs and symptoms. No history of underlying headache disorders. He has a history of remote concussive injuries but no recent head injuries. He does endorse some posterior neck pain but no radicular symptoms. No history of stroke. Getting Tylenol for his headache pains and topiramate and Fioricet was started by primary.     There is no family present he is otherwise medically stable    Current Facility-Administered Medications   Medication Dose Route Frequency Provider Last Rate Last Admin    topiramate (TOPAMAX) tablet 25 mg  25 mg Oral BID Aida Lion MD        butalbital-acetaminophen-caffeine (FIORICET, ESGIC) per tablet 1 tablet  1 tablet Oral Q4H PRN Adia Lion MD        sodium chloride flush 0.9 % injection 5-40 mL  5-40 mL Intravenous 2 times per day Bryant Taylor MD   10 mL at 06/24/21 2150    sodium chloride flush 0.9 % injection 5-40 mL  5-40 mL Intravenous PRN Bryant Taylor MD        0.9 % sodium chloride infusion  25 mL Intravenous PRN MD Mandeep Butts cervical paraspinals  Eyes: conjunctivae/corneas clear--fundi not well-visualized  Neck: Limited range of motion anterior and posterior but no cervicalgia  Lungs: clear to auscultation bilaterally  Heart: regular rate and rhythm  Extremities: normal, atraumatic, no cyanosis or edema  Pulses: 2+ and symmetric  Skin: color, texture, turgor normal---no rashes or lesions      Mental Status: Alert, oriented x4    Appropriate attention/concentration  Intact fundus of knowledge  Slightly slower processing speed    Speech: no dysarthria  Language: Mild word finding troubles but repetition, object identification, and overall fluency is intact    Cranial Nerves:  I: smell NA   II: visual acuity  NA   II: visual fields Full to confrontation   II: pupils RAYMOND   III,VII: ptosis None   III,IV,VI: extraocular muscles  Full ROM   V: mastication Normal   V: facial light touch sensation  Normal   V,VII: corneal reflex     VII: facial muscle function - upper  Normal   VII: facial muscle function - lower Normal   VIII: hearing Normal   IX: soft palate elevation  Normal   IX,X: gag reflex    XI: trapezius strength  5/5   XI: sternocleidomastoid strength 5/5   XI: neck extension strength  5/5   XII: tongue strength  Normal     Motor:  5/5 throughout  Normal bulk and tone  No drift or abnormal movements    Sensory:  LT normal in all limbs    Coordination:   FN, FFM normal b/l    DTR:   2+ throughout    No Heredia's    No pathological reflexes    Laboratory/Radiology:     CBC with Differential:    Lab Results   Component Value Date    WBC 6.7 06/24/2021    RBC 4.00 06/24/2021    HGB 13.3 06/24/2021    HCT 38.6 06/24/2021     06/24/2021    MCV 96.5 06/24/2021    MCH 33.3 06/24/2021    MCHC 34.5 06/24/2021    RDW 12.8 06/24/2021    LYMPHOPCT 14.0 06/24/2021    MONOPCT 8.5 06/24/2021    BASOPCT 0.3 06/24/2021    MONOSABS 0.57 06/24/2021    LYMPHSABS 0.94 06/24/2021    EOSABS 0.04 06/24/2021    BASOSABS 0.02 06/24/2021     CMP:    Lab Results   Component Value Date     06/24/2021    K 3.8 06/24/2021     06/24/2021    CO2 27 06/24/2021    BUN 20 06/24/2021    CREATININE 1.2 06/24/2021    GFRAA >60 06/24/2021    LABGLOM 57 06/24/2021    GLUCOSE 105 06/24/2021    PROT 6.6 06/24/2021    LABALBU 4.3 06/24/2021    CALCIUM 9.4 06/24/2021    BILITOT 0.5 06/24/2021    ALKPHOS 68 06/24/2021    AST 15 06/24/2021    ALT 17 06/24/2021     CTA head/neck: Mild atherosclerotic disease . No large vessel occlusion identified 2. Estimated stenosis of the proximal right and left internal carotid artery by NASCET criteria is not hemodynamically significant     CTP: questionable area of perfusion defect L parietal lobe seen on TMAX    Sed and CRP normal    MRI brain WWO: (Radiology report pending): No obvious acute strokes, bleeding, or contrast-enhancing lesions. EEG pending    All labs and images personally reviewed today    Assessment:     New onset headache with AMS: No obvious acute abnormalities on contrasted MRI of the brain, but will await final radiology interpretation. Must consider seizure with postictal headache, particularly with possible perfusion defect L parietal lobe-- and EEG is pending. No evidence to suggest inflammatory headache disorder at this time. A much rarer possibility is occult subarachnoid hemorrhage. Having occipital type headaches with some migrainous features today but mental status/language has improved and no focal deficits.      Plan:     Await final radiology report for MRI    EEG pending    Agree with addition of topiramate per primary    Fioricet per primary discretion    Will follow    MAGALY Vickers - CNP  8:25 AM  6/25/2021

## 2021-06-25 NOTE — PROGRESS NOTES
Occupational Therapy  OT BEDSIDE TREATMENT NOTE   [address]      Date:2021  Patient Name: Maxine White  MRN: [id number]  : 1931  Room: [address] [place] [address]     Evaluating OT: LISA Cárdenas, OTR/L  # [id number]     Referring Provider:  Viviane Borjas MD  Specific Provider Orders:  Vani Ruiz and Treat\"21     Diagnosis: r/o Stroke  1. Acute intractable headache, unspecified headache type    2. Aphasia          Surgeries this admission: None      Pertinent Medical History: OA, Radiculopathy of Cervical Spine       Precautions:  Fall Risk  Moderate Expressive Aphasia  [place]  Cognition/Bed Alarm     Appears to have John LEs weakness/foot drop - see PT eval     Assessment of current deficits   [x]? Functional mobility                         [x]?ADLs           [x]? Strength                  [x]? Cognition   [x]? Functional transfers           [x]? IADLs         [x]? Safety Awareness   [x]? Endurance   []? Fine Coordination              [x]? Balance      []? Vision/perception   []? Sensation     []? Gross Motor Coordination  []? ROM           []?  Delirium                   []? Motor Control         OT PLAN OF CARE   OT POC based on physician orders, patient diagnosis and results of clinical assessment     Frequency/Duration 1-3 days/wk for 2 weeks PRN   Specific OT Treatment to include:   * Instruction/training on adapted ADL techniques and AE recommendations to increase functional independence within precautions       * Training on energy conservation strategies, correct breathing pattern and techniques to improve independence/tolerance for self-care routine  * Functional transfer/mobility training/DME recommendations for increased independence, safety, and fall prevention  * Patient/Family education to increase follow through with safety techniques and functional independence  * Recommendation of environmental modifications for increased safety with functional transfers/mobility and ADLs  * Cognitive retraining/development of therapeutic activities to improve problem solving, judgement, memory, and attention for increased safety/participation in ADL/IADL tasks  * Sensory re-education to improve body/limb awareness, maintain/improve skin integrity, and improve hand/UE motor function  * Visual-perceptual training to improve environmental scanning, visual attention/focus, and oculomotor skills for increased safety/independence with functional transfers/mobility and ADLs  * Splinting/positioning for increased function, prevention of contractures, and improve skin integrity  * Therapeutic exercise to improve motor endurance, ROM, and functional strength for ADLs/functional transfers  * Therapeutic activities to facilitate/challenge dynamic balance, stand tolerance for increased safety and independence with ADLs  * Therapeutic activities to facilitate gross/fine motor skills for increased independence with ADLs  * Neuro-muscular re-education: facilitation of righting/equilibrium reactions, midline orientation, scapular stability/mobility, normalization of muscle tone, and facilitation of volitional active controled movement  * Positioning to improve skin integrity, interaction with environment and functional independence  * Delirium prevention/treatment     Modified [place] Scale (MRS)  Score     Description  0             No symptoms  1             No significant disability despite symptoms  2             Slight disability; able to look after own affairs  3             Moderate disability; able to ambulate without assist/ requires assist with ADLs  4             Moderate/Severe disability;requires assist to ambulate/assist with ADLs  5             Severe disability;bedridden/incontinent   6               Score:   4     Pt was admitted w/ Difficulty speaking, HA.       Recommended Adaptive Equipment: shower chair, grab bars        Pt was a Fair historian - Moderate Expressive Aphasia - no family present during interview:     Home Living:  Pt lives with his wife in a single-level house/condo, No Basement. Bathroom setup:  Tub-Shower, Standard-height Commode   Equipment owned:  [facility]     Available Family Assist:  Wife can provide assist PRN - uncertain of her ability to safely provide physical assist     Prior Level of Function:  Per Pt, he was IND with all ADLs, IADLs, Transfers and Mobility using SPC for ambulation. Driving:  Yes  Occupation:  Retired      Pain Level:  Denied pain     Cognition: A & O x 2   Able to Follow Multi-Step Commands w/ Mod VCs, [place]              Memory:  fair               Sequencing:  fair (-)              Problem solving:  fair (-)              Judgement/safety:  fair (-)  Additional Comments:  Pt was pleasant and cooperative.                   Functional Assessment:  AM-PAC Daily Activity Raw Score: 17/24       Initial Eval Status  Date: 6/24/21    Treatment Status  Date: 6/25/21 STGs = LTGs  Time frame: 10-14 days   Feeding Set up     Able to retrieve cup from table, drink from cup w/o Difficulty     set up   NA   Grooming SUP/Min VCs     Able to wash hands/face w/ Cloht after set up seated EOB, Min VCs to problem solve task  Unsafe/Unable to tolerate ambulating to or standing at sink     Min A  To wash hands standing at sink SUP/Set up  Standing at the [employer]   UB Dressing Mod A     Mod A + Mod VCs to problem solve task of donning robe after set up seated EOB, unsafe for item retrieval, extended time    Mod A  Per last tx  SUP/Set up      LB Dressing Mod A     SUP/Min VCs for safety/problem solving to don/doff socks w/ cross-legged tech seated EOB, Mod A for simulated clothing adjustment over hips + Mod A for dynamic standing balance + Mod  VCs for safety/adaptive techs/problem solving, extended time     Min A  SBA to don/doff socks seated EOB.  Demonstrates ability to don pants seated and standing to pull over hips Min A      Bathing NT    Mod A  simulated  Min A       Toileting NT     Pt declined     CGA- hygiene  Min A- clothing management  Min A      Bed Mobility  Supine to sit: Min A   Sit to supine:  Min A      Min A + Min VCs to problem solve task + extended time    SBA- supine>sit  Educated pt on technique to increase independence.    Supine to sit: SUP  Sit to supine: SUP      Functional Transfers Mod A     Mod A + Mod VCs for safety/hand placement to stand from EOB     Min A- sit<->stand  Cuing for hand placement  Mod A- toilet transfer  Using grab bar and w/w Min A      Functional Mobility Mod A     Mod A + Mod VCs  VCs for safety/improved safety awareness, walker safety w/ WW 1-2 side-steps along EOB, moderately unsteady    Mod A  To and from bathroom using w/w  Min A      Balance Sitting:     Static:  SUP EOB    Dynamic:  Close SUP w/ functional ax unsupported EOB     Standing:     Static:  Mod A w/ [employer]    Dynamic:  Mod-Max A w/ functional ax/mobility w/ [employer]    Sitting:     Static:  SUP     Dynamic:  SBA     Standing:  Min A     Activity Tolerance Fair     Sitting Tolerance:  ~ 20 mins EOB w/ functional ax  Standing Tolerance:  ~ 2 mins w/ [employer]    Fair+  Good(-)   Visual/  Perceptual     Hearing: WFL - described chronic blurred vision; No Visual Neglect noted, Scanning/Tracking WFL  Glasses: Yes - not present at b/s - able to read orientation board     WFL/Somewhat [place]  Hearing Aids:  No                        Comments: Upon arrival pt supine in bed. Pt educated on techniques to increase independence and safety during ADL's, bed mobility, and functional transfers. Discussed home set up with pt, giving suggestions to increase safety at discharge. Discussed levels of assist and rehab options available. At end of session pt left seated in bedside chair, call light within reach, wife present. · Pt has made fair progress towards set goals.      · Continue with current plan of care    Treatment Time In: 3:15            Treatment Time Out: 3:40             Treatment Charges: Mins Units   Ther Ex  [id number]     Manual Therapy [id number]     Thera Activities [id number] 10 1   ADL/Home Mgt [id number] 15 1   Neuro Re-ed [id number]     Group Therapy      Orthotic manage/training  [id number]     Non-Billable Time     Total Timed Treatment 25 2       Alyssa Boykin

## 2021-06-25 NOTE — PROCEDURES
[facility] Neurodiagnostic Report    MRN: [id number]   PATIENT NAME: Tiffany Sharp   DATE OF REPORT: 2021     DATE OF SERVICE: 2021    PHYSICIAN NAME: Gaurav Hernández DO  Referring Physician: Gino Bazzi      Patient's : 1931   Patient's Age: 80 y.o. Gender: male     PROCEDURE: Routine EEG with video      Clinical Interpretation: This abnormal study showed evidence of:    1. Mild to moderate nonspecific cerebral dysfunction of the bilateral anterior temporal regions  2. A mild nonspecific encephalopathy    Structural abnormalities should be considered for the findings above and appropriate imaging obtained if clinically indicated. No seizures or epileptiform discharges were noted during this study. 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 Scalp electrodes were applied in the international 10/20 system. Multiple digital montage arrangements were utilized for evaluation. EKG and video were recorded. Background:      Occipital rhythm (posterior dominant rhythm or PDR): Present   Frequency: 8 Hz  Voltage: Medium   Organization: fair   Reactivity to eye opening/closure: fair    Drowsiness: Present - normal  Sleep: Absent    Comments:  The background is composed primarily of generalized irregular theta activity during the waking state. Technical and Activation Procedures:  Hyperventilation: Not done        Photic stimulation: Not done        Reactivity to stimulation: Yes    Abnormalities:    I. Seizures? No    II. Rhythmic or Periodic Patterns? No    III. Other Abnormalities?         Rare to occasional irregular delta activity noted independently at F8, T4 and F7, T3

## 2021-06-25 NOTE — PROGRESS NOTES
PT SEEN AND EXAMINED. Chart reviewed. meds reviewed. D/w nursing + family as available. EXAM: IN GENERAL, NAD. AWAKE AND ALERT. ROS NEGx10 EXCEPT:   /60   Pulse 89   Temp 98.3 °F (36.8 °C) (Temporal)   Resp 16   Ht 6' (1.829 m)   Wt 180 lb (81.6 kg)   SpO2 97%   BMI 24.41 kg/m²   GEN: A+O NAD. HEENT: NCAT. EOMI. GUY  NECK: NO JVD. TRACH MIDLINE. NO BRUITS. NO THYROMEGALY. LUNGS: CTA BL NO RALES, RHONCHI OR WHEEZES. GOOD EXCURSION. CV: Regular rate and rhythm, NO Murmurs, Rubs, Or gallops  ABD: Soft. Nontender. Normal bowel sounds. No organomegaly  EXT:No clubbing cyanosis or edema  Neuro: Alert and oriented x 3. No focal motor deficits. No sensory deficits. Reflexes appear intact.   Labs/data reviewedLABS: CBC with Differential:    Lab Results   Component Value Date    WBC 6.7 06/24/2021    RBC 4.00 06/24/2021    HGB 13.3 06/24/2021    HCT 38.6 06/24/2021     06/24/2021    MCV 96.5 06/24/2021    MCH 33.3 06/24/2021    MCHC 34.5 06/24/2021    RDW 12.8 06/24/2021    LYMPHOPCT 14.0 06/24/2021    MONOPCT 8.5 06/24/2021    BASOPCT 0.3 06/24/2021    MONOSABS 0.57 06/24/2021    LYMPHSABS 0.94 06/24/2021    EOSABS 0.04 06/24/2021    BASOSABS 0.02 06/24/2021     Platelets:    Lab Results   Component Value Date     06/24/2021     CMP:    Lab Results   Component Value Date     06/24/2021    K 3.8 06/24/2021     06/24/2021    CO2 27 06/24/2021    BUN 20 06/24/2021    CREATININE 1.2 06/24/2021    GFRAA >60 06/24/2021    LABGLOM 57 06/24/2021    GLUCOSE 105 06/24/2021    PROT 6.6 06/24/2021    LABALBU 4.3 06/24/2021    CALCIUM 9.4 06/24/2021    BILITOT 0.5 06/24/2021    ALKPHOS 68 06/24/2021    AST 15 06/24/2021    ALT 17 06/24/2021   ESR=3  Magnesium:  No results found for: MG  LDH:  No results found for: LDH  PT/INR:    Lab Results   Component Value Date    PROTIME 13.9 06/24/2021    INR 1.3 06/24/2021     Last 3 Troponin:  No results found for: TROPONINI  ABG:  No results found for: PH, PCO2, PO2, HCO3, BE, THGB, TCO2, O2SAT  IRON:  No results found for: IRON  IMAGING    CT Head WO Contrast    Result Date: 6/24/2021  EXAMINATION: CT OF THE HEAD WITHOUT CONTRAST  6/24/2021 1:47 am TECHNIQUE: CT of the head was performed without the administration of intravenous contrast. Dose modulation, iterative reconstruction, and/or weight based adjustment of the mA/kV was utilized to reduce the radiation dose to as low as reasonably achievable. COMPARISON: None. HISTORY: ORDERING SYSTEM PROVIDED HISTORY: aphasia TECHNOLOGIST PROVIDED HISTORY: Reason for exam:->aphasia Has a \"code stroke\" or \"stroke alert\" been called? ->Yes Decision Support Exception - unselect if not a suspected or confirmed emergency medical condition->Emergency Medical Condition (MA) What reading provider will be dictating this exam?->[facility] FINDINGS: BRAIN/VENTRICLES: There is no acute intracranial hemorrhage, mass effect or midline shift. No abnormal extra-axial fluid collection. The gray-white differentiation is maintained without evidence of an acute infarct. There is no evidence of hydrocephalus. There is moderate cerebral atrophy. 1 ORBITS: The visualized portion of the orbits demonstrate no acute abnormality. SINUSES: The visualized paranasal sinuses and mastoid air cells demonstrate no acute abnormality. SOFT TISSUES/SKULL:  No acute abnormality of the visualized skull or soft tissues. No acute intracranial abnormality. XR CHEST PORTABLE    Result Date: 6/24/2021  EXAMINATION: ONE XRAY VIEW OF THE CHEST 6/24/2021 2:20 am COMPARISON: 04/27/2009 HISTORY: ORDERING SYSTEM PROVIDED HISTORY: weakness fever TECHNOLOGIST PROVIDED HISTORY: Reason for exam:->weakness fever What reading provider will be dictating this exam?->[facility] FINDINGS: There is no cardiomegaly. There is no congestion, infiltrate, pleural effusion or pneumothorax. There is mild elevation of the left hemidiaphragm. No acute abnormality identified.      CTA NECK W CONTRAST    Result Date: 2021  Patient MRN:  [id number] : 1931 Age: 80 years Gender: Male Order Date:  2021 1:55 AM EXAM: CTA NECK W CONTRAST, CTA HEAD W CONTRAST NUMBER OF IMAGES:  [id number] INDICATION:  aphasia aphasia Decision Support Exception - unselect if not a suspected or confirmed emergency medical condition->Emergency Medical Condition (MA) What reading provider will be dictating this exam?->[facility] COMPARISON: None Technique: Low-dose CT  acquisition technique included one of following options; 1 . Automated exposure control, 2. Adjustment of MA and or KV according to patient's size or 3. Use of iterative reconstruction. Contiguous spiral images were obtained in the axial plane, following the administration of intravenous contrast using CT angiographic protocol. Sagittal and coronal images were reconstructed from the axial plane acquisition. Additional MIP reconstructions were presented to aid in the interpretation of this study. Images were obtained from the skull base cranially. There is mild calcified plaque identified in the vessels compatible with atherosclerotic disease. There is aortic arch and great vessels demonstrate mild atherosclerotic disease. The right carotid is abnormal. There is no evidence for hemodynamically significant stenosis at the level the proximal internal carotid artery. By NASCET criteria estimated stenosis is 50% or less The left carotid is abnormal. There is no evidence for hemodynamically significant stenosis at the level the proximal internal carotid artery. By NASCET criteria estimated stenosis is 50% or less The right vertebral artery is unremarkable. The left vertebral artery is unremarkable. The basilar artery is unremarkable. The middle cerebral arteries are unremarkable. The anterior cerebral arteries are unremarkable. The posterior cerebral arteries are unremarkable. 1.  Mild atherosclerotic disease . No large vessel occlusion identified 2. Estimated stenosis of the proximal right and left internal carotid artery by NASCET criteria is not hemodynamically significant This study was analyzed by the [employer]. ai algorithm. CT BRAIN PERFUSION    Result Date: 2021  Patient MRN: [id number] : 1931 Age:  80 years Gender: Male Order Date: 2021 1:55 AM Exam: CT BRAIN PERFUSION Number of Images: 333 views Indication:   aphasia aphasia Decision Support Exception - unselect if not a suspected or confirmed emergency medical condition->Emergency Medical Condition (MA) What reading provider will be dictating this exam?->[facility] Comparison: None. Findings: Perfusion images demonstrate symmetric blood volume Blood flow images demonstrate symmetric blood flow There is no significant ischemic penumbra identified. There is no significant core infarct identified. No significant ischemic penumbra identified This study was analyzed by the [employer]. ai algorithm. CTA HEAD W CONTRAST    Result Date: 2021  Patient MRN:  [id number] : 1931 Age: 80 years Gender: Male Order Date:  2021 1:55 AM EXAM: CTA NECK W CONTRAST, CTA HEAD W CONTRAST NUMBER OF IMAGES:  [id number] INDICATION:  aphasia aphasia Decision Support Exception - unselect if not a suspected or confirmed emergency medical condition->Emergency Medical Condition (MA) What reading provider will be dictating this exam?->[facility] COMPARISON: None Technique: Low-dose CT  acquisition technique included one of following options; 1 . Automated exposure control, 2. Adjustment of MA and or KV according to patient's size or 3. Use of iterative reconstruction. Contiguous spiral images were obtained in the axial plane, following the administration of intravenous contrast using CT angiographic protocol. Sagittal and coronal images were reconstructed from the axial plane acquisition. Additional MIP reconstructions were presented to aid in the interpretation of this study.  Images were obtained from the skull base cranially. There is mild calcified plaque identified in the vessels compatible with atherosclerotic disease. There is aortic arch and great vessels demonstrate mild atherosclerotic disease. The right carotid is abnormal. There is no evidence for hemodynamically significant stenosis at the level the proximal internal carotid artery. By NASCET criteria estimated stenosis is 50% or less The left carotid is abnormal. There is no evidence for hemodynamically significant stenosis at the level the proximal internal carotid artery. By NASCET criteria estimated stenosis is 50% or less The right vertebral artery is unremarkable. The left vertebral artery is unremarkable. The basilar artery is unremarkable. The middle cerebral arteries are unremarkable. The anterior cerebral arteries are unremarkable. The posterior cerebral arteries are unremarkable. 1.  Mild atherosclerotic disease . No large vessel occlusion identified 2. Estimated stenosis of the proximal right and left internal carotid artery by NASCET criteria is not hemodynamically significant This study was analyzed by the [employer] ai algorithm. DIET:  ADULT DIET;  Regular    Medications:    Scheduled Meds:   topiramate  25 mg Oral BID    sodium chloride flush  5-40 mL Intravenous 2 times per day    enoxaparin  40 mg Subcutaneous Daily    Vitamin D  1,000 Units Oral Daily    pantoprazole  40 mg Oral QAM AC    sodium chloride flush  5-40 mL Intravenous 2 times per day    atorvastatin  40 mg Oral Nightly    aspirin  81 mg Oral Daily    clopidogrel  75 mg Oral Daily       Continuous Infusions:   sodium chloride      sodium chloride         PRN Meds:butalbital-acetaminophen-caffeine, sodium chloride flush, sodium chloride, ondansetron **OR** ondansetron, meclizine, sodium chloride flush, sodium chloride, polyethylene glycol, acetaminophen **OR** acetaminophen, hydrALAZINE    A/P:      Patient Active Problem List   Diagnosis    Primary osteoarthritis involving multiple joints    Neck pain    Ataxia    Neuropathy    Gastroesophageal reflux disease without esophagitis    Vitamin D deficiency    B12 deficiency    Bilateral hearing loss    Diverticulosis    BPH (benign prostatic hyperplasia)    Slow transit constipation    Bilateral carpal tunnel syndrome    Cervical radiculopathy    MCI (mild cognitive impairment)    Stroke-like symptoms    Aphasia    INTRACTABLE HA    PLAN:FOR MRI  START TOPAMAX  FIORICET PRN  PT/OT  BP BETTER      I can be reached though [facility] or [facility] at [phone]

## 2021-06-25 NOTE — PROGRESS NOTES
Nutrition Assessment     Type and Reason for Visit: Initial, Positive Nutrition Screen    Nutrition Recommendations/Plan:  Continue Current Diet, Start Oral Nutrition Supplement    Nutrition Assessment:  Pt admit w/ HA & Aphasia pending etiology workup. Noted +NS for poor PO/wt loss PTA. Limited hx on file to assess. Will add Ensure Enlive BID & monitor    Malnutrition Assessment:  Malnutrition Status: Insufficient data      Nutrition Related Findings: Alert/poor attention/ aphasia, -I/O's, no edema, abd WDL      Current Nutrition Therapies:    ADULT DIET; Regular    Anthropometric Measures:  · Height: 6' (182.9 cm)  · Current Body Wt: 180 lb (81.6 kg) (no method)   · BMI: 24.4    Nutrition Diagnosis:   No nutrition diagnosis at this time     Nutrition Interventions:     Nutrition Education/Counseling:  Education not indicated   Coordination of Nutrition Care:  Continue to monitor while inpatient    Goals:  Pt to consume >75% meals/ONS       Nutrition Monitoring and Evaluation:   Food/Nutrient Intake Outcomes:  Food and Nutrient Intake, Supplement Intake  Physical Signs/Symptoms Outcomes:  Biochemical Data, Nutrition Focused Physical Findings, Skin, Weight, GI Status, Fluid Status or Edema     Discharge Planning:     Too soon to determine     Electronically signed by Azul Souza RD, LD on 6/25/21 at 2:00 PM EDT    Contact: Ext [phone]

## 2021-06-26 PROBLEM — R29.90 STROKE-LIKE SYMPTOMS: Status: RESOLVED | Noted: 2021-06-24 | Resolved: 2021-06-26

## 2021-06-26 PROBLEM — R47.01 APHASIA: Status: RESOLVED | Noted: 2021-06-24 | Resolved: 2021-06-26

## 2021-06-26 PROBLEM — G43.909 ACUTE CONFUSIONAL MIGRAINE: Status: ACTIVE | Noted: 2021-06-26

## 2021-06-26 PROCEDURE — 6370000000 HC RX 637 (ALT 250 FOR IP): Performed by: INTERNAL MEDICINE

## 2021-06-26 PROCEDURE — 2580000003 HC RX 258: Performed by: INTERNAL MEDICINE

## 2021-06-26 PROCEDURE — 2580000003 HC RX 258: Performed by: EMERGENCY MEDICINE

## 2021-06-26 PROCEDURE — 99233 SBSQ HOSP IP/OBS HIGH 50: CPT | Performed by: NURSE PRACTITIONER

## 2021-06-26 PROCEDURE — 2060000000 HC ICU INTERMEDIATE R&B

## 2021-06-26 PROCEDURE — 6360000002 HC RX W HCPCS: Performed by: EMERGENCY MEDICINE

## 2021-06-26 PROCEDURE — 6370000000 HC RX 637 (ALT 250 FOR IP): Performed by: PSYCHIATRY & NEUROLOGY

## 2021-06-26 RX ADMIN — Medication 10 ML: at 20:46

## 2021-06-26 RX ADMIN — TOPIRAMATE 25 MG: 25 TABLET, FILM COATED ORAL at 09:01

## 2021-06-26 RX ADMIN — PANTOPRAZOLE SODIUM 40 MG: 40 TABLET, DELAYED RELEASE ORAL at 09:01

## 2021-06-26 RX ADMIN — Medication 1000 UNITS: at 09:01

## 2021-06-26 RX ADMIN — TOPIRAMATE 25 MG: 25 TABLET, FILM COATED ORAL at 20:46

## 2021-06-26 RX ADMIN — Medication 10 ML: at 09:01

## 2021-06-26 RX ADMIN — Medication 10 ML: at 20:45

## 2021-06-26 RX ADMIN — CLOPIDOGREL 75 MG: 75 TABLET, FILM COATED ORAL at 09:01

## 2021-06-26 RX ADMIN — ASPIRIN 81 MG CHEWABLE TABLET 81 MG: 81 TABLET CHEWABLE at 09:01

## 2021-06-26 RX ADMIN — ENOXAPARIN SODIUM 40 MG: 40 INJECTION SUBCUTANEOUS at 09:01

## 2021-06-26 RX ADMIN — ATORVASTATIN CALCIUM 40 MG: 40 TABLET, FILM COATED ORAL at 20:46

## 2021-06-26 ASSESSMENT — PAIN SCALES - GENERAL
PAINLEVEL_OUTOF10: 0
PAINLEVEL_OUTOF10: 0

## 2021-06-26 NOTE — PROGRESS NOTES
Karen Hoang is a 80 y.o. right handed male     Neuro is following for AMS    PMH: HLD, cervical radiculopathy    Synopsis:  Presented with aphasia--had new onset occipital HA the past several days followed by spell of nausea, worsening confusion and language issues leading to admission. Questionable area of abnormal perfusion in left parietal lobe seen on T-max but not appreciated by radiology. No history of underlying headache disorders. MRI of the brain and EEG were unrevealing. He feels much better today and denies any headache--on topiramate 25 mg twice daily and has used no Fioricet. He states he does get head pains when he lays on his back and needs a soft pillow.   He does note a history of neck pain and cervical radiculopathy    There is no family present he is medically stable    Current Facility-Administered Medications   Medication Dose Route Frequency Provider Last Rate Last Admin    topiramate (TOPAMAX) tablet 25 mg  25 mg Oral BID Narinder Hernandez MD   25 mg at 06/26/21 0901    butalbital-acetaminophen-caffeine (FIORICET, ESGIC) per tablet 1 tablet  1 tablet Oral Q4H PRN Narinder Hernandez MD   1 tablet at 06/25/21 1503    sodium chloride flush 0.9 % injection 5-40 mL  5-40 mL Intravenous 2 times per day Elise Jennings MD   10 mL at 06/26/21 0901    sodium chloride flush 0.9 % injection 5-40 mL  5-40 mL Intravenous PRN Elise Jennings MD        0.9 % sodium chloride infusion  25 mL Intravenous PRN Elise Jennings MD        enoxaparin (LOVENOX) injection 40 mg  40 mg Subcutaneous Daily Elise Jennings MD   40 mg at 06/26/21 0901    ondansetron (ZOFRAN-ODT) disintegrating tablet 4 mg  4 mg Oral Q8H PRN Elise Jennings MD   4 mg at 06/24/21 1327    Or    ondansetron (ZOFRAN) injection 4 mg  4 mg Intravenous Q6H PRN Elise Jennings MD        Vitamin D (CHOLECALCIFEROL) tablet 1,000 Units  1,000 Units Oral Daily Narinder Hernandez MD   1,000 Units at 06/26/21 0901    meclizine (ANTIVERT) tablet 12.5 mg dysarthria  Language: normal today    Cranial Nerves:  I: smell NA   II: visual acuity  NA   II: visual fields Full to confrontation   II: pupils RAYMOND   III,VII: ptosis None   III,IV,VI: extraocular muscles  Full ROM   V: mastication Normal   V: facial light touch sensation  Normal   V,VII: corneal reflex     VII: facial muscle function - upper  Normal   VII: facial muscle function - lower Normal   VIII: hearing Normal   IX: soft palate elevation  Normal   IX,X: gag reflex    XI: trapezius strength  5/5   XI: sternocleidomastoid strength 5/5   XI: neck extension strength  5/5   XII: tongue strength  Normal     Motor:  5/5 throughout  Normal bulk and tone  No drift or abnormal movements    Sensory:  LT normal in all limbs    Coordination:   FN, FFM normal b/l    Gait:  Spastic and cautious with walker    DTR:   2+ throughout    No Heredia's    No pathological reflexes    Laboratory/Radiology:     CBC with Differential:    Lab Results   Component Value Date    WBC 6.7 06/24/2021    RBC 4.00 06/24/2021    HGB 13.3 06/24/2021    HCT 38.6 06/24/2021     06/24/2021    MCV 96.5 06/24/2021    MCH 33.3 06/24/2021    MCHC 34.5 06/24/2021    RDW 12.8 06/24/2021    LYMPHOPCT 14.0 06/24/2021    MONOPCT 8.5 06/24/2021    BASOPCT 0.3 06/24/2021    MONOSABS 0.57 06/24/2021    LYMPHSABS 0.94 06/24/2021    EOSABS 0.04 06/24/2021    BASOSABS 0.02 06/24/2021     CMP:    Lab Results   Component Value Date     06/24/2021    K 3.8 06/24/2021     06/24/2021    CO2 27 06/24/2021    BUN 20 06/24/2021    CREATININE 1.2 06/24/2021    GFRAA >60 06/24/2021    LABGLOM 57 06/24/2021    GLUCOSE 105 06/24/2021    PROT 6.6 06/24/2021    LABALBU 4.3 06/24/2021    CALCIUM 9.4 06/24/2021    BILITOT 0.5 06/24/2021    ALKPHOS 68 06/24/2021    AST 15 06/24/2021    ALT 17 06/24/2021     CTA head/neck: Mild atherosclerotic disease . No large vessel occlusion identified 2.  Estimated stenosis of the proximal right and left internal carotid artery by NASCET criteria is not hemodynamically significant     CTP: questionable area of perfusion defect L parietal lobe seen on TMAX    Sed and CRP normal    MRI brain WWO:  No obvious acute strokes, bleeding, or contrast-enhancing lesions. EEG This abnormal study showed evidence of:   1. Mild to moderate nonspecific cerebral dysfunction of the bilateral anterior temporal regions  2. A mild nonspecific encephalopathy   Structural abnormalities should be considered for the findings above and appropriate imaging obtained if clinically indicated. No seizures or epileptiform discharges were noted during this study. All labs and images personally reviewed today    Assessment:     Possible confusional migraine: Causing new onset headache and altered mental status. Lack of preceding history of headache disorders concerning, but work-up for inflammatory, ischemic, or epileptic causes proved unrevealing. Must also consider possible cervicogenic causes with his history of radiculopathy. His exam is back to baseline.     Plan:     Continue topiramate 25 mg twice daily at discharge    Fioricet per primary discretion; consider Nurtec or Ubrelvy as OP    Consider imaging of cervical spine as outpatient    No driving until cleared by neurology     Neuro signing off-call with new issues    Follow-up with me in the [facility] office in 2 to 3 weeks    MAGALY Gonzalez CNP  9:49 AM  6/26/2021

## 2021-06-26 NOTE — PROGRESS NOTES
Subjective: The patient is awake and alert. No problems overnight. Denies chest pain, angina, and dyspnea. Denies abdominal pain. Tolerating diet. No nausea or vomiting. He does admit HA is better, but he also seems somewhat confused. Objective:  Pt is resting in nad   BP (!) 94/51   Pulse 69   Temp 97.7 °F (36.5 °C)   Resp 15   Ht 6' (1.829 m)   Wt 180 lb (81.6 kg)   SpO2 96%   BMI 24.41 kg/m²   HEENT no adenopathy no bruits  Heart:  RRR, no murmurs, gallops, or rubs.   Lungs:  CTA bilaterally, no wheeze, rales or rhonchi  Abd: bowel sounds present, nontender, nondistended, no masses  Extrem:  No clubbing, cyanosis, or edema  WBC/Hgb/Hct/Plts:  6.7/13.3/38.6/245 (06/24 9256) basic metabolic panel     Assessment:    Patient Active Problem List   Diagnosis    Primary osteoarthritis involving multiple joints    Neck pain    Ataxia    Neuropathy    Gastroesophageal reflux disease without esophagitis    Vitamin D deficiency    B12 deficiency    Bilateral hearing loss    Diverticulosis    BPH (benign prostatic hyperplasia)    Slow transit constipation    Bilateral carpal tunnel syndrome    Cervical radiculopathy    MCI (mild cognitive impairment)    Stroke-like symptoms    Aphasia    New onset of headaches after age 48   Earline Robb Altered mental status       Plan:  Await EEG results  Increase activity         Evelin Saleem DO  6:41 AM  6/26/2021

## 2021-06-27 PROCEDURE — 2580000003 HC RX 258: Performed by: EMERGENCY MEDICINE

## 2021-06-27 PROCEDURE — 6370000000 HC RX 637 (ALT 250 FOR IP): Performed by: INTERNAL MEDICINE

## 2021-06-27 PROCEDURE — 2580000003 HC RX 258: Performed by: INTERNAL MEDICINE

## 2021-06-27 PROCEDURE — 2060000000 HC ICU INTERMEDIATE R&B

## 2021-06-27 PROCEDURE — 6360000002 HC RX W HCPCS: Performed by: EMERGENCY MEDICINE

## 2021-06-27 PROCEDURE — 6370000000 HC RX 637 (ALT 250 FOR IP): Performed by: PSYCHIATRY & NEUROLOGY

## 2021-06-27 RX ADMIN — Medication 1000 UNITS: at 07:48

## 2021-06-27 RX ADMIN — Medication 10 ML: at 07:48

## 2021-06-27 RX ADMIN — Medication 10 ML: at 19:40

## 2021-06-27 RX ADMIN — TOPIRAMATE 25 MG: 25 TABLET, FILM COATED ORAL at 19:40

## 2021-06-27 RX ADMIN — ATORVASTATIN CALCIUM 40 MG: 40 TABLET, FILM COATED ORAL at 19:40

## 2021-06-27 RX ADMIN — ASPIRIN 81 MG CHEWABLE TABLET 81 MG: 81 TABLET CHEWABLE at 07:48

## 2021-06-27 RX ADMIN — PANTOPRAZOLE SODIUM 40 MG: 40 TABLET, DELAYED RELEASE ORAL at 05:21

## 2021-06-27 RX ADMIN — Medication 10 ML: at 07:49

## 2021-06-27 RX ADMIN — ENOXAPARIN SODIUM 40 MG: 40 INJECTION SUBCUTANEOUS at 07:48

## 2021-06-27 RX ADMIN — CLOPIDOGREL 75 MG: 75 TABLET, FILM COATED ORAL at 07:48

## 2021-06-27 RX ADMIN — TOPIRAMATE 25 MG: 25 TABLET, FILM COATED ORAL at 07:48

## 2021-06-27 ASSESSMENT — PAIN SCALES - GENERAL
PAINLEVEL_OUTOF10: 0

## 2021-06-27 NOTE — PROGRESS NOTES
Subjective: The patient is resting, when woken he is still somewhat confused. No problems overnight. Denies chest pain, angina, and dyspnea. Denies abdominal pain. Tolerating diet. No nausea or vomiting. HA is better. Objective:  Pt is resting in nad   /68   Pulse 70   Temp 98 °F (36.7 °C) (Oral)   Resp 16   Ht 6' (1.829 m)   Wt 180 lb (81.6 kg)   SpO2 95%   BMI 24.41 kg/m²   HEENT no adenopathy no bruits  Heart:  RRR, no murmurs, gallops, or rubs. Lungs:  CTA bilaterally, no wheeze, rales or rhonchi  Abd: bowel sounds present, nontender, nondistended, no masses  Extrem:  No clubbing, cyanosis, or edema    basic metabolic panel     Assessment:    Patient Active Problem List   Diagnosis    Primary osteoarthritis involving multiple joints    Neck pain    Ataxia    Neuropathy    Gastroesophageal reflux disease without esophagitis    Vitamin D deficiency    B12 deficiency    Bilateral hearing loss    Diverticulosis    BPH (benign prostatic hyperplasia)    Slow transit constipation    Bilateral carpal tunnel syndrome    Cervical radiculopathy    MCI (mild cognitive impairment)    New onset of headaches after age 48    Altered mental status    Acute confusional migraine       Plan:   To decide on rehab on Mon, will need SNF         Bailey Chaparro DO  6:13 AM  6/27/2021

## 2021-06-28 PROCEDURE — 6360000002 HC RX W HCPCS: Performed by: EMERGENCY MEDICINE

## 2021-06-28 PROCEDURE — 6370000000 HC RX 637 (ALT 250 FOR IP): Performed by: INTERNAL MEDICINE

## 2021-06-28 PROCEDURE — 2580000003 HC RX 258: Performed by: INTERNAL MEDICINE

## 2021-06-28 PROCEDURE — 6370000000 HC RX 637 (ALT 250 FOR IP): Performed by: PSYCHIATRY & NEUROLOGY

## 2021-06-28 PROCEDURE — 2060000000 HC ICU INTERMEDIATE R&B

## 2021-06-28 RX ADMIN — ATORVASTATIN CALCIUM 40 MG: 40 TABLET, FILM COATED ORAL at 20:34

## 2021-06-28 RX ADMIN — CLOPIDOGREL 75 MG: 75 TABLET, FILM COATED ORAL at 08:52

## 2021-06-28 RX ADMIN — Medication 10 ML: at 20:35

## 2021-06-28 RX ADMIN — Medication 1000 UNITS: at 08:52

## 2021-06-28 RX ADMIN — PANTOPRAZOLE SODIUM 40 MG: 40 TABLET, DELAYED RELEASE ORAL at 05:25

## 2021-06-28 RX ADMIN — TOPIRAMATE 25 MG: 25 TABLET, FILM COATED ORAL at 20:34

## 2021-06-28 RX ADMIN — ENOXAPARIN SODIUM 40 MG: 40 INJECTION SUBCUTANEOUS at 08:52

## 2021-06-28 RX ADMIN — ASPIRIN 81 MG CHEWABLE TABLET 81 MG: 81 TABLET CHEWABLE at 08:52

## 2021-06-28 RX ADMIN — TOPIRAMATE 25 MG: 25 TABLET, FILM COATED ORAL at 08:52

## 2021-06-28 RX ADMIN — Medication 10 ML: at 08:52

## 2021-06-28 ASSESSMENT — PAIN SCALES - GENERAL
PAINLEVEL_OUTOF10: 0

## 2021-06-28 NOTE — PROGRESS NOTES
PT SEEN AND EXAMINED. Chart reviewed. meds reviewed. D/w nursing + family as available. EXAM: IN GENERAL, NAD. AWAKE AND ALERT. ROS NEGx10 EXCEPT: HA is better, has some slight aphasia(expressive)  /70   Pulse 72   Temp 98 °F (36.7 °C) (Oral)   Resp 16   Ht 6' (1.829 m)   Wt 180 lb (81.6 kg)   SpO2 96%   BMI 24.41 kg/m²   GEN: A+O NAD. HEENT: NCAT. EOMI. GUY  NECK: NO JVD. TRACH MIDLINE. NO BRUITS. NO THYROMEGALY. LUNGS: CTA BL NO RALES, RHONCHI OR WHEEZES. GOOD EXCURSION. CV: Regular rate and rhythm, NO Murmurs, Rubs, Or gallops  ABD: Soft. Nontender. Normal bowel sounds. No organomegaly  EXT:No clubbing cyanosis or edema  Neuro: Alert and oriented x 3. No focal motor deficits. No sensory deficits. Reflexes appear intact.   Labs/data reviewedLABS: CBC with Differential:    Lab Results   Component Value Date    WBC 6.7 06/24/2021    RBC 4.00 06/24/2021    HGB 13.3 06/24/2021    HCT 38.6 06/24/2021     06/24/2021    MCV 96.5 06/24/2021    MCH 33.3 06/24/2021    MCHC 34.5 06/24/2021    RDW 12.8 06/24/2021    LYMPHOPCT 14.0 06/24/2021    MONOPCT 8.5 06/24/2021    BASOPCT 0.3 06/24/2021    MONOSABS 0.57 06/24/2021    LYMPHSABS 0.94 06/24/2021    EOSABS 0.04 06/24/2021    BASOSABS 0.02 06/24/2021     Platelets:    Lab Results   Component Value Date     06/24/2021     CMP:    Lab Results   Component Value Date     06/24/2021    K 3.8 06/24/2021     06/24/2021    CO2 27 06/24/2021    BUN 20 06/24/2021    CREATININE 1.2 06/24/2021    GFRAA >60 06/24/2021    LABGLOM 57 06/24/2021    GLUCOSE 105 06/24/2021    PROT 6.6 06/24/2021    LABALBU 4.3 06/24/2021    CALCIUM 9.4 06/24/2021    BILITOT 0.5 06/24/2021    ALKPHOS 68 06/24/2021    AST 15 06/24/2021    ALT 17 06/24/2021   ESR=3  Magnesium:  No results found for: MG  LDH:  No results found for: LDH  PT/INR:    Lab Results   Component Value Date    PROTIME 13.9 06/24/2021    INR 1.3 06/24/2021     Last 3 Troponin:  No results found for: TROPONINI  ABG:  No results found for: PH, PCO2, PO2, HCO3, BE, THGB, TCO2, O2SAT  IRON:  No results found for: IRON  IMAGING    CT Head WO Contrast    Result Date: 6/24/2021  EXAMINATION: CT OF THE HEAD WITHOUT CONTRAST  6/24/2021 1:47 am TECHNIQUE: CT of the head was performed without the administration of intravenous contrast. Dose modulation, iterative reconstruction, and/or weight based adjustment of the mA/kV was utilized to reduce the radiation dose to as low as reasonably achievable. COMPARISON: None. HISTORY: ORDERING SYSTEM PROVIDED HISTORY: aphasia TECHNOLOGIST PROVIDED HISTORY: Reason for exam:->aphasia Has a \"code stroke\" or \"stroke alert\" been called? ->Yes Decision Support Exception - unselect if not a suspected or confirmed emergency medical condition->Emergency Medical Condition (MA) What reading provider will be dictating this exam?->CRC FINDINGS: BRAIN/VENTRICLES: There is no acute intracranial hemorrhage, mass effect or midline shift. No abnormal extra-axial fluid collection. The gray-white differentiation is maintained without evidence of an acute infarct. There is no evidence of hydrocephalus. There is moderate cerebral atrophy. 1 ORBITS: The visualized portion of the orbits demonstrate no acute abnormality. SINUSES: The visualized paranasal sinuses and mastoid air cells demonstrate no acute abnormality. SOFT TISSUES/SKULL:  No acute abnormality of the visualized skull or soft tissues. No acute intracranial abnormality. XR CHEST PORTABLE    Result Date: 6/24/2021  EXAMINATION: ONE XRAY VIEW OF THE CHEST 6/24/2021 2:20 am COMPARISON: 04/27/2009 HISTORY: ORDERING SYSTEM PROVIDED HISTORY: weakness fever TECHNOLOGIST PROVIDED HISTORY: Reason for exam:->weakness fever What reading provider will be dictating this exam?->CRC FINDINGS: There is no cardiomegaly. There is no congestion, infiltrate, pleural effusion or pneumothorax. There is mild elevation of the left hemidiaphragm.      No acute abnormality identified. CTA NECK W CONTRAST    Result Date: 2021  Patient MRN:  [id number] : 1931 Age: 80 years Gender: Male Order Date:  2021 1:55 AM EXAM: CTA NECK W CONTRAST, CTA HEAD W CONTRAST NUMBER OF IMAGES:  [id number] INDICATION:  aphasia aphasia Decision Support Exception - unselect if not a suspected or confirmed emergency medical condition->Emergency Medical Condition (MA) What reading provider will be dictating this exam?->[facility] COMPARISON: None Technique: Low-dose CT  acquisition technique included one of following options; 1 . Automated exposure control, 2. Adjustment of MA and or KV according to patient's size or 3. Use of iterative reconstruction. Contiguous spiral images were obtained in the axial plane, following the administration of intravenous contrast using CT angiographic protocol. Sagittal and coronal images were reconstructed from the axial plane acquisition. Additional MIP reconstructions were presented to aid in the interpretation of this study. Images were obtained from the skull base cranially. There is mild calcified plaque identified in the vessels compatible with atherosclerotic disease. There is aortic arch and great vessels demonstrate mild atherosclerotic disease. The right carotid is abnormal. There is no evidence for hemodynamically significant stenosis at the level the proximal internal carotid artery. By NASCET criteria estimated stenosis is 50% or less The left carotid is abnormal. There is no evidence for hemodynamically significant stenosis at the level the proximal internal carotid artery. By NASCET criteria estimated stenosis is 50% or less The right vertebral artery is unremarkable. The left vertebral artery is unremarkable. The basilar artery is unremarkable. The middle cerebral arteries are unremarkable. The anterior cerebral arteries are unremarkable. The posterior cerebral arteries are unremarkable. 1.  Mild atherosclerotic disease .  No large vessel occlusion identified 2. Estimated stenosis of the proximal right and left internal carotid artery by NASCET criteria is not hemodynamically significant This study was analyzed by the [employer]. ai algorithm. CT BRAIN PERFUSION    Result Date: 2021  Patient MRN: [id number] : 1931 Age:  80 years Gender: Male Order Date: 2021 1:55 AM Exam: CT BRAIN PERFUSION Number of Images: 333 views Indication:   aphasia aphasia Decision Support Exception - unselect if not a suspected or confirmed emergency medical condition->Emergency Medical Condition (MA) What reading provider will be dictating this exam?->[facility] Comparison: None. Findings: Perfusion images demonstrate symmetric blood volume Blood flow images demonstrate symmetric blood flow There is no significant ischemic penumbra identified. There is no significant core infarct identified. No significant ischemic penumbra identified This study was analyzed by the [employer]. ai algorithm. CTA HEAD W CONTRAST    Result Date: 2021  Patient MRN:  [id number] : 1931 Age: 80 years Gender: Male Order Date:  2021 1:55 AM EXAM: CTA NECK W CONTRAST, CTA HEAD W CONTRAST NUMBER OF IMAGES:  [id number] INDICATION:  aphasia aphasia Decision Support Exception - unselect if not a suspected or confirmed emergency medical condition->Emergency Medical Condition (MA) What reading provider will be dictating this exam?->[facility] COMPARISON: None Technique: Low-dose CT  acquisition technique included one of following options; 1 . Automated exposure control, 2. Adjustment of MA and or KV according to patient's size or 3. Use of iterative reconstruction. Contiguous spiral images were obtained in the axial plane, following the administration of intravenous contrast using CT angiographic protocol. Sagittal and coronal images were reconstructed from the axial plane acquisition. Additional MIP reconstructions were presented to aid in the interpretation of this study.  Images were obtained from the skull base cranially. There is mild calcified plaque identified in the vessels compatible with atherosclerotic disease. There is aortic arch and great vessels demonstrate mild atherosclerotic disease. The right carotid is abnormal. There is no evidence for hemodynamically significant stenosis at the level the proximal internal carotid artery. By NASCET criteria estimated stenosis is 50% or less The left carotid is abnormal. There is no evidence for hemodynamically significant stenosis at the level the proximal internal carotid artery. By NASCET criteria estimated stenosis is 50% or less The right vertebral artery is unremarkable. The left vertebral artery is unremarkable. The basilar artery is unremarkable. The middle cerebral arteries are unremarkable. The anterior cerebral arteries are unremarkable. The posterior cerebral arteries are unremarkable. 1.  Mild atherosclerotic disease . No large vessel occlusion identified 2. Estimated stenosis of the proximal right and left internal carotid artery by NASCET criteria is not hemodynamically significant This study was analyzed by the [employer] ai algorithm. DIET:  ADULT DIET;  Regular  Adult Oral Nutrition Supplement; Standard High Calorie/High Protein Oral Supplement    Medications:    Scheduled Meds:   topiramate  25 mg Oral BID    sodium chloride flush  5-40 mL Intravenous 2 times per day    enoxaparin  40 mg Subcutaneous Daily    Vitamin D  1,000 Units Oral Daily    pantoprazole  40 mg Oral QAM AC    sodium chloride flush  5-40 mL Intravenous 2 times per day    atorvastatin  40 mg Oral Nightly    aspirin  81 mg Oral Daily    clopidogrel  75 mg Oral Daily       Continuous Infusions:   sodium chloride      sodium chloride         PRN Meds:butalbital-acetaminophen-caffeine, sodium chloride flush, sodium chloride, ondansetron **OR** ondansetron, meclizine, sodium chloride flush, sodium chloride, polyethylene glycol, acetaminophen **OR**

## 2021-06-28 NOTE — CARE COORDINATION
Checked with [facility] - [place] to see if Aphasia was a qualifying Dx for admission. Unless a Dx of CVA is added then Pt does not have a qualifying dx. Informed Pt that he does not qualify for ARU. Spoke with Wife oLlis Dickey about other choices for AKANKSHA d/t pt not qualifying for ARU. Lolis Noble will discuss with Pt and give SW choices tomorrow. SW/CM to follow for discharge needs.    Cory Tsang, L.S.W.  [phone]

## 2021-06-29 LAB
BLOOD CULTURE, ROUTINE: NORMAL
CULTURE, BLOOD 2: NORMAL

## 2021-06-29 PROCEDURE — 97535 SELF CARE MNGMENT TRAINING: CPT

## 2021-06-29 PROCEDURE — 6370000000 HC RX 637 (ALT 250 FOR IP): Performed by: INTERNAL MEDICINE

## 2021-06-29 PROCEDURE — 6360000002 HC RX W HCPCS: Performed by: EMERGENCY MEDICINE

## 2021-06-29 PROCEDURE — 2580000003 HC RX 258: Performed by: INTERNAL MEDICINE

## 2021-06-29 PROCEDURE — 6370000000 HC RX 637 (ALT 250 FOR IP): Performed by: PSYCHIATRY & NEUROLOGY

## 2021-06-29 PROCEDURE — 97530 THERAPEUTIC ACTIVITIES: CPT

## 2021-06-29 PROCEDURE — 2060000000 HC ICU INTERMEDIATE R&B

## 2021-06-29 RX ADMIN — PANTOPRAZOLE SODIUM 40 MG: 40 TABLET, DELAYED RELEASE ORAL at 06:56

## 2021-06-29 RX ADMIN — TOPIRAMATE 25 MG: 25 TABLET, FILM COATED ORAL at 09:36

## 2021-06-29 RX ADMIN — ATORVASTATIN CALCIUM 40 MG: 40 TABLET, FILM COATED ORAL at 20:34

## 2021-06-29 RX ADMIN — TOPIRAMATE 25 MG: 25 TABLET, FILM COATED ORAL at 20:34

## 2021-06-29 RX ADMIN — ASPIRIN 81 MG CHEWABLE TABLET 81 MG: 81 TABLET CHEWABLE at 09:36

## 2021-06-29 RX ADMIN — ENOXAPARIN SODIUM 40 MG: 40 INJECTION SUBCUTANEOUS at 09:36

## 2021-06-29 RX ADMIN — CLOPIDOGREL 75 MG: 75 TABLET, FILM COATED ORAL at 09:37

## 2021-06-29 RX ADMIN — Medication 10 ML: at 20:34

## 2021-06-29 RX ADMIN — Medication 10 ML: at 09:36

## 2021-06-29 RX ADMIN — Medication 1000 UNITS: at 09:37

## 2021-06-29 ASSESSMENT — PAIN SCALES - GENERAL
PAINLEVEL_OUTOF10: 0

## 2021-06-29 NOTE — PROGRESS NOTES
PT SEEN AND EXAMINED. Chart reviewed. meds reviewed. D/w nursing + family as available. EXAM: IN GENERAL, NAD. AWAKE AND ALERT. ROS NEGx10 EXCEPT: HA is better,/71   Pulse 87   Temp 98.3 °F (36.8 °C) (Axillary)   Resp 14   Ht 6' (1.829 m)   Wt 180 lb (81.6 kg)   SpO2 97%   BMI 24.41 kg/m²   GEN: A+O NAD. HEENT: NCAT. EOMI. GUY  NECK: NO JVD. TRACH MIDLINE. NO BRUITS. NO THYROMEGALY. LUNGS: CTA BL NO RALES, RHONCHI OR WHEEZES. GOOD EXCURSION. CV: Regular rate and rhythm, NO Murmurs, Rubs, Or gallops  ABD: Soft. Nontender. Normal bowel sounds. No organomegaly  EXT:No clubbing cyanosis or edema  Neuro: Alert and oriented x 3. No focal motor deficits. No sensory deficits. Reflexes appear intact.   Labs/data reviewedLABS: CBC with Differential:    Lab Results   Component Value Date    WBC 6.7 06/24/2021    RBC 4.00 06/24/2021    HGB 13.3 06/24/2021    HCT 38.6 06/24/2021     06/24/2021    MCV 96.5 06/24/2021    MCH 33.3 06/24/2021    MCHC 34.5 06/24/2021    RDW 12.8 06/24/2021    LYMPHOPCT 14.0 06/24/2021    MONOPCT 8.5 06/24/2021    BASOPCT 0.3 06/24/2021    MONOSABS 0.57 06/24/2021    LYMPHSABS 0.94 06/24/2021    EOSABS 0.04 06/24/2021    BASOSABS 0.02 06/24/2021     Platelets:    Lab Results   Component Value Date     06/24/2021     CMP:    Lab Results   Component Value Date     06/24/2021    K 3.8 06/24/2021     06/24/2021    CO2 27 06/24/2021    BUN 20 06/24/2021    CREATININE 1.2 06/24/2021    GFRAA >60 06/24/2021    LABGLOM 57 06/24/2021    GLUCOSE 105 06/24/2021    PROT 6.6 06/24/2021    LABALBU 4.3 06/24/2021    CALCIUM 9.4 06/24/2021    BILITOT 0.5 06/24/2021    ALKPHOS 68 06/24/2021    AST 15 06/24/2021    ALT 17 06/24/2021   ESR=3  Magnesium:  No results found for: MG  LDH:  No results found for: LDH  PT/INR:    Lab Results   Component Value Date    PROTIME 13.9 06/24/2021    INR 1.3 06/24/2021     Last 3 Troponin:  No results found for: TROPONINI  ABG:  No results found for: PH, PCO2, PO2, HCO3, BE, THGB, TCO2, O2SAT  IRON:  No results found for: IRON  IMAGING    CT Head WO Contrast    Result Date: 6/24/2021  EXAMINATION: CT OF THE HEAD WITHOUT CONTRAST  6/24/2021 1:47 am TECHNIQUE: CT of the head was performed without the administration of intravenous contrast. Dose modulation, iterative reconstruction, and/or weight based adjustment of the mA/kV was utilized to reduce the radiation dose to as low as reasonably achievable. COMPARISON: None. HISTORY: ORDERING SYSTEM PROVIDED HISTORY: aphasia TECHNOLOGIST PROVIDED HISTORY: Reason for exam:->aphasia Has a \"code stroke\" or \"stroke alert\" been called? ->Yes Decision Support Exception - unselect if not a suspected or confirmed emergency medical condition->Emergency Medical Condition (MA) What reading provider will be dictating this exam?->[facility] FINDINGS: BRAIN/VENTRICLES: There is no acute intracranial hemorrhage, mass effect or midline shift. No abnormal extra-axial fluid collection. The gray-white differentiation is maintained without evidence of an acute infarct. There is no evidence of hydrocephalus. There is moderate cerebral atrophy. 1 ORBITS: The visualized portion of the orbits demonstrate no acute abnormality. SINUSES: The visualized paranasal sinuses and mastoid air cells demonstrate no acute abnormality. SOFT TISSUES/SKULL:  No acute abnormality of the visualized skull or soft tissues. No acute intracranial abnormality. XR CHEST PORTABLE    Result Date: 6/24/2021  EXAMINATION: ONE XRAY VIEW OF THE CHEST 6/24/2021 2:20 am COMPARISON: 04/27/2009 HISTORY: ORDERING SYSTEM PROVIDED HISTORY: weakness fever TECHNOLOGIST PROVIDED HISTORY: Reason for exam:->weakness fever What reading provider will be dictating this exam?->[facility] FINDINGS: There is no cardiomegaly. There is no congestion, infiltrate, pleural effusion or pneumothorax. There is mild elevation of the left hemidiaphragm.      No acute abnormality identified. CTA NECK W CONTRAST    Result Date: 2021  Patient MRN:  [id number] : 1931 Age: 80 years Gender: Male Order Date:  2021 1:55 AM EXAM: CTA NECK W CONTRAST, CTA HEAD W CONTRAST NUMBER OF IMAGES:  [id number] INDICATION:  aphasia aphasia Decision Support Exception - unselect if not a suspected or confirmed emergency medical condition->Emergency Medical Condition (MA) What reading provider will be dictating this exam?->[facility] COMPARISON: None Technique: Low-dose CT  acquisition technique included one of following options; 1 . Automated exposure control, 2. Adjustment of MA and or KV according to patient's size or 3. Use of iterative reconstruction. Contiguous spiral images were obtained in the axial plane, following the administration of intravenous contrast using CT angiographic protocol. Sagittal and coronal images were reconstructed from the axial plane acquisition. Additional MIP reconstructions were presented to aid in the interpretation of this study. Images were obtained from the skull base cranially. There is mild calcified plaque identified in the vessels compatible with atherosclerotic disease. There is aortic arch and great vessels demonstrate mild atherosclerotic disease. The right carotid is abnormal. There is no evidence for hemodynamically significant stenosis at the level the proximal internal carotid artery. By NASCET criteria estimated stenosis is 50% or less The left carotid is abnormal. There is no evidence for hemodynamically significant stenosis at the level the proximal internal carotid artery. By NASCET criteria estimated stenosis is 50% or less The right vertebral artery is unremarkable. The left vertebral artery is unremarkable. The basilar artery is unremarkable. The middle cerebral arteries are unremarkable. The anterior cerebral arteries are unremarkable. The posterior cerebral arteries are unremarkable. 1.  Mild atherosclerotic disease .  No large vessel occlusion identified 2. Estimated stenosis of the proximal right and left internal carotid artery by NASCET criteria is not hemodynamically significant This study was analyzed by the [employer]. ai algorithm. CT BRAIN PERFUSION    Result Date: 2021  Patient MRN: [id number] : 1931 Age:  80 years Gender: Male Order Date: 2021 1:55 AM Exam: CT BRAIN PERFUSION Number of Images: 333 views Indication:   aphasia aphasia Decision Support Exception - unselect if not a suspected or confirmed emergency medical condition->Emergency Medical Condition (MA) What reading provider will be dictating this exam?->[facility] Comparison: None. Findings: Perfusion images demonstrate symmetric blood volume Blood flow images demonstrate symmetric blood flow There is no significant ischemic penumbra identified. There is no significant core infarct identified. No significant ischemic penumbra identified This study was analyzed by the [employer]. ai algorithm. CTA HEAD W CONTRAST    Result Date: 2021  Patient MRN:  [id number] : 1931 Age: 80 years Gender: Male Order Date:  2021 1:55 AM EXAM: CTA NECK W CONTRAST, CTA HEAD W CONTRAST NUMBER OF IMAGES:  [id number] INDICATION:  aphasia aphasia Decision Support Exception - unselect if not a suspected or confirmed emergency medical condition->Emergency Medical Condition (MA) What reading provider will be dictating this exam?->[facility] COMPARISON: None Technique: Low-dose CT  acquisition technique included one of following options; 1 . Automated exposure control, 2. Adjustment of MA and or KV according to patient's size or 3. Use of iterative reconstruction. Contiguous spiral images were obtained in the axial plane, following the administration of intravenous contrast using CT angiographic protocol. Sagittal and coronal images were reconstructed from the axial plane acquisition. Additional MIP reconstructions were presented to aid in the interpretation of this study.  Images were obtained from the skull base cranially. There is mild calcified plaque identified in the vessels compatible with atherosclerotic disease. There is aortic arch and great vessels demonstrate mild atherosclerotic disease. The right carotid is abnormal. There is no evidence for hemodynamically significant stenosis at the level the proximal internal carotid artery. By NASCET criteria estimated stenosis is 50% or less The left carotid is abnormal. There is no evidence for hemodynamically significant stenosis at the level the proximal internal carotid artery. By NASCET criteria estimated stenosis is 50% or less The right vertebral artery is unremarkable. The left vertebral artery is unremarkable. The basilar artery is unremarkable. The middle cerebral arteries are unremarkable. The anterior cerebral arteries are unremarkable. The posterior cerebral arteries are unremarkable. 1.  Mild atherosclerotic disease . No large vessel occlusion identified 2. Estimated stenosis of the proximal right and left internal carotid artery by NASCET criteria is not hemodynamically significant This study was analyzed by the [employer] ai algorithm. DIET:  ADULT DIET;  Regular  Adult Oral Nutrition Supplement; Standard High Calorie/High Protein Oral Supplement    Medications:    Scheduled Meds:   topiramate  25 mg Oral BID    sodium chloride flush  5-40 mL Intravenous 2 times per day    enoxaparin  40 mg Subcutaneous Daily    Vitamin D  1,000 Units Oral Daily    pantoprazole  40 mg Oral QAM AC    sodium chloride flush  5-40 mL Intravenous 2 times per day    atorvastatin  40 mg Oral Nightly    aspirin  81 mg Oral Daily    clopidogrel  75 mg Oral Daily       Continuous Infusions:   sodium chloride      sodium chloride         PRN Meds:butalbital-acetaminophen-caffeine, sodium chloride flush, sodium chloride, ondansetron **OR** ondansetron, meclizine, sodium chloride flush, sodium chloride, polyethylene glycol, acetaminophen **OR** acetaminophen, hydrALAZINE    A/P:      Patient Active Problem List   Diagnosis    Primary osteoarthritis involving multiple joints    Neck pain    Ataxia    Neuropathy    Gastroesophageal reflux disease without esophagitis    Vitamin D deficiency    B12 deficiency    Bilateral hearing loss    Diverticulosis    BPH (benign prostatic hyperplasia)    Slow transit constipation    Bilateral carpal tunnel syndrome    Cervical radiculopathy    MCI (mild cognitive impairment)    New onset of headaches after age 48   [facility] Altered mental status    Acute confusional migraine    INTRACTABLE HA    PLAN:plan dc for AKANKSHA when bed avail   CONT TOPAMAX  FIORICET PRN  PT/OT  dw pt    I can be reached though [facility] or [facility] at [phone]

## 2021-06-29 NOTE — CARE COORDINATION
Spoke with Wife by phone about Choices for AKANKSHA. Wife said Therapy told Pt that he is too good for AKANKSHA. Wife would like [facility]. Wife would like  to check for Availability with [facility]. Obtained availability and called wife back. Wife choose [facility]. Referral made to [facility]. Pt accepted. [facility] can see Pt on Thursday. Discharge Plan is to return Home with [facility] when medically stable. MARIA LUISA/CLAIR to follow for discharge needs.    Pete Rucker, L.S.W.  [phone]

## 2021-06-29 NOTE — PROGRESS NOTES
Occupational Therapy  OT BEDSIDE TREATMENT NOTE   [facility] [address]  [address] [place] [address]      Date:2021  Patient Name: Chance Jim  MRN: [id number]  : 1931  Room: [address] [place] [address]     Evaluating OT: LISA Macdonald, OTR/L  # [id number]     Referring Provider:  Pablo Appiah MD  Specific Provider Orders:  Ruth Dutta and Treat\"21     Diagnosis: r/o Stroke  1. Acute intractable headache, unspecified headache type    2. Aphasia        Surgeries this admission: None      Pertinent Medical History: OA, Radiculopathy of Cervical Spine       Precautions:  Fall Risk  Moderate Expressive Aphasia  [place]  Cognition/Bed Alarm     Appears to have John LEs weakness/foot drop - see PT eval     Assessment of current deficits   [x]? Functional mobility                         [x]?ADLs           [x]? Strength                  [x]? Cognition   [x]? Functional transfers           [x]? IADLs         [x]? Safety Awareness   [x]? Endurance   []? Fine Coordination              [x]? Balance      []? Vision/perception   []? Sensation     []? Gross Motor Coordination  []? ROM           []?  Delirium                   []? Motor Control         OT PLAN OF CARE   OT POC based on physician orders, patient diagnosis and results of clinical assessment     Frequency/Duration 1-3 days/wk for 2 weeks PRN   Specific OT Treatment to include:   * Instruction/training on adapted ADL techniques and AE recommendations to increase functional independence within precautions       * Training on energy conservation strategies, correct breathing pattern and techniques to improve independence/tolerance for self-care routine  * Functional transfer/mobility training/DME recommendations for increased independence, safety, and fall prevention  * Patient/Family education to increase follow through with safety techniques and functional independence  * Recommendation of environmental modifications for increased safety with functional transfers/mobility and ADLs  * Cognitive retraining/development of therapeutic activities to improve problem solving, judgement, memory, and attention for increased safety/participation in ADL/IADL tasks  * Sensory re-education to improve body/limb awareness, maintain/improve skin integrity, and improve hand/UE motor function  * Visual-perceptual training to improve environmental scanning, visual attention/focus, and oculomotor skills for increased safety/independence with functional transfers/mobility and ADLs  * Splinting/positioning for increased function, prevention of contractures, and improve skin integrity  * Therapeutic exercise to improve motor endurance, ROM, and functional strength for ADLs/functional transfers  * Therapeutic activities to facilitate/challenge dynamic balance, stand tolerance for increased safety and independence with ADLs  * Therapeutic activities to facilitate gross/fine motor skills for increased independence with ADLs  * Neuro-muscular re-education: facilitation of righting/equilibrium reactions, midline orientation, scapular stability/mobility, normalization of muscle tone, and facilitation of volitional active controled movement  * Positioning to improve skin integrity, interaction with environment and functional independence  * Delirium prevention/treatment     Modified Saunders Scale (MRS)  Score     Description  0             No symptoms  1             No significant disability despite symptoms  2             Slight disability; able to look after own affairs  3             Moderate disability; able to ambulate without assist/ requires assist with ADLs  4             Moderate/Severe disability;requires assist to ambulate/assist with ADLs  5             Severe disability;bedridden/incontinent   6               Score:   4     Pt was admitted w/ Difficulty speaking, HA.       Recommended Adaptive Equipment: shower chair, grab bars     Pt was a Fair historian - Moderate Expressive Aphasia - no family present during interview:     Home Living:  Pt lives with his wife in a single-level house/condo, No Basement. Bathroom setup:  Tub-Shower, Standard-height Commode   Equipment owned:  [facility]     Available Family Assist:  Wife can provide assist PRN - uncertain of her ability to safely provide physical assist     Prior Level of Function:  Per Pt, he was IND with all ADLs, IADLs, Transfers and Mobility using SPC for ambulation. Driving:  Yes  Occupation:  Retired      Pain Level:  Denied pain     Cognition: A & O x 3; name, month and year. Able to Follow Multi-Step Commands w/ Mod VCs, [place]              Memory:  fair+              Sequencing:  fair +              Problem solving:  fair               Judgement/safety:  fair+  Additional Comments:  Pt was pleasant and cooperative.                   Functional Assessment:  AM-PAC Daily Activity Raw Score: 18/24       Initial Eval Status  Date: 6/24/21    Treatment Status  Date: 6/29/21 STGs = LTGs  Time frame: 10-14 days   Feeding Set up     Able to retrieve cup from table, drink from cup w/o Difficulty    Setup; To complete self feeding while sitting up in the chair. NA   Grooming SUP/Min VCs     Able to wash hands/face w/ Cloht after set up seated EOB, Min VCs to problem solve task  Unsafe/Unable to tolerate ambulating to or standing at sink    Sup; To perform washing face, and combing hair while sitting up in the chair. SUP/Set up  Standing at the [address] [place] [address] Avenue A     Mod A + Mod VCs to problem solve task of donning robe after set up seated EOB, unsafe for item retrieval, extended time    SBA; To martín/doff gown gown while sitting up on the EOB.     SUP/Set up      LB Dressing Mod A     SUP/Min VCs for safety/problem solving to don/doff socks w/ cross-legged tech seated EOB, Mod A for simulated clothing adjustment over hips + Mod A for dynamic standing balance + Mod  VCs for safety/adaptive techs/problem solving, extended time     Setup; To sit up in the chair to martín/doff socks with increased time. Min A      Bathing NT    N/T;    Pt declined to complete bathing tasks at this time. Min A       Toileting NT     Pt declined     SBA- hygiene  Min A- clothing management pt leaning up against the wall to improve balance. Min A      Bed Mobility  Supine to sit: Min A   Sit to supine:  Min A      Min A + Min VCs to problem solve task + extended time    Sup - supine>sit    Educated pt on technique to increase independence.    Supine to sit: SUP  Sit to supine: SUP      Functional Transfers Mod A     Mod A + Mod VCs for safety/hand placement to stand from EOB     SBA- sit<->stand    Cuing for hand placement  Mod A- toilet transfer  Using grab bar and w/w. Min A      Functional Mobility Mod A     Mod A + Mod VCs  VCs for safety/improved safety awareness, walker safety w/ WW 1-2 side-steps along EOB, moderately unsteady    Min A    To ambulate through room and in hallway with Min A and use of w/w. Min A      Balance Sitting:     Static:  SUP EOB    Dynamic:  Close SUP w/ functional ax unsupported EOB     Standing:     Static:  Mod A w/ [employer]    Dynamic:  Mod-Max A w/ functional ax/mobility w/ [employer]    Sitting: Indep    Standing: Min A w/w     Activity Tolerance Fair     Sitting Tolerance:  ~ 20 mins EOB w/ functional ax  Standing Tolerance:  ~ 2 mins w/ [employer]    Fair+; Pt impulsive and requires increased verbal prompts to improves safety. Good(-)   Visual/  Perceptual     Hearing: WFL - described chronic blurred vision; No Visual Neglect noted, Scanning/Tracking WFL  Glasses: Yes - not present at b/s - able to read orientation board     WFL/Somewhat [place]  Hearing Aids:  No     WL                 Comments: Upon arrival pt supine in bed. Pt agreeable to participate in therapy. Pt educated on techniques to increase balance and safety during ADL's, bed mobility, and functional transfers.  At end of session pt left seated in bedside chair and call light within reach. · Pt has made fair progress towards set goals.    · Continue with current plan of care    Treatment Time In: 2646            Treatment Time Out: 1057  Treatment Charges: Mins Units   Ther Ex  [id number]     Manual Therapy [address] [facility]     Thera Activities [id number] 13 1   ADL/Home Mgt [id number] 15 1   Neuro Re-ed 93511     Group Therapy      Orthotic manage/training  54094     Non-Billable Time     Total Timed Treatment 28 [id number] [facility] [address], [address]

## 2021-06-29 NOTE — PROGRESS NOTES
Per Dr Rivka Yost, patient not appropriate for ARU. Rec [facility].

## 2021-06-29 NOTE — HOME CARE
[facility] received referral. Will follow after discharge. Spoke with patient and verified demographics. Sandra Samano LPN, [facility].

## 2021-06-29 NOTE — CONSULTS
[facility]                  [address] [place],  [facility]                                  CONSULTATION    PATIENT NAME: Roque Frey                      :        1931  MED REC NO:   [id number]                            ROOM:       4505  ACCOUNT NO:   [de-identified]                           ADMIT DATE: 2021  PROVIDER:     Nelia Nissen, MD    CONSULT DATE:  2021    CHIEF COMPLAINT:  Migraine with neurologic deficit. HISTORY OF PRESENT ILLNESS:  The patient was admitted to [address] [place] [address]  on 2021 with a sudden onset of a severe migraine. He did have  what appeared to be some stroke-like symptoms with dysarthria and  weakness. He was seen by Neurology and Neurosurgery. Imaging studies  did not show any apparent ischemic stroke and his symptoms did improve. At this point, he seems to be back to his baseline neurologically. He  has been seen by Therapy. There appeared to be some aphasia initially,  which is cleared. He was at a min to mod assist with OT a few days ago,  but he tells me he is doing much better now. He also initially was at a  min to mod assist with PT, again that was several days ago and he tells  me he is doing better now and that also appears to be the case from the  Neurology notes. PAST MEDICAL HISTORY:  1.  Vitamin D deficiency. 2.  B12 deficiency. 3.  Prostatic hypertrophy. 4.  Cervical radiculopathy. ALLERGIES:  None known. CURRENT MEDICATIONS:  Aspirin, Lipitor, Plavix, Protonix, Topamax. REVIEW OF SYSTEMS:  Systems were reviewed and negative except as  detailed above. PHYSICAL EXAMINATION:  GENERAL:  Well-nourished, well-developed white male in no acute  distress. HEAD:  Normocephalic, atraumatic. EYES:  I do note nystagmus to both sides. MOUTH:  Pharynx normal.  LUNGS:  Clear to P and A. HEART:  Regular rate and rhythm. S1, S2 normal.  No murmurs or gallops.   ABDOMEN:  Bowel sounds normal.  Soft, nontender. No masses or  organomegaly. EXTREMITIES:  Without clubbing, cyanosis, or edema. MENTAL STATUS:  Alert and oriented with no apparent aphasia. SENSATION:  Intact. NEUROMUSCULAR:  Basically normal strength throughout. PROBLEM LIST:  1. Acute migraine. 2.  TIA-like symptoms with left hemiparesis. 3.  History of cervical radiculopathy. RECOMMENDATIONS:  At this point, the patient's neurologic status seems  to have recovered and I do not think he would meet criteria for acute  rehab. There was no confirmed evidence of stroke, so we are probably  dealing more with a TIA-like syndrome. At this point, I would recommend  either home therapy or subacute and we would reconsider for acute rehab  if his symptoms recur.         Pamela Ramos MD    D: 06/29/2021 9:54:59       T: 06/29/2021 10:00:14     [email or web address]  Job#: [id number]     Doc#: [id number]    CC:

## 2021-06-30 VITALS
RESPIRATION RATE: 16 BRPM | HEART RATE: 85 BPM | OXYGEN SATURATION: 98 % | SYSTOLIC BLOOD PRESSURE: 118 MMHG | WEIGHT: 180 LBS | TEMPERATURE: 97.9 F | DIASTOLIC BLOOD PRESSURE: 77 MMHG | HEIGHT: 72 IN | BODY MASS INDEX: 24.38 KG/M2

## 2021-06-30 PROCEDURE — 6360000002 HC RX W HCPCS: Performed by: EMERGENCY MEDICINE

## 2021-06-30 PROCEDURE — 2580000003 HC RX 258: Performed by: INTERNAL MEDICINE

## 2021-06-30 PROCEDURE — 6370000000 HC RX 637 (ALT 250 FOR IP): Performed by: PSYCHIATRY & NEUROLOGY

## 2021-06-30 PROCEDURE — 6370000000 HC RX 637 (ALT 250 FOR IP): Performed by: INTERNAL MEDICINE

## 2021-06-30 RX ORDER — MECLIZINE HCL 12.5 MG/1
12.5 TABLET ORAL 3 TIMES DAILY PRN
Qty: 30 TABLET | Refills: 0 | Status: SHIPPED | OUTPATIENT
Start: 2021-06-30 | End: 2021-07-10

## 2021-06-30 RX ORDER — ATORVASTATIN CALCIUM 40 MG/1
40 TABLET, FILM COATED ORAL NIGHTLY
Qty: 30 TABLET | Refills: 3 | Status: SHIPPED | OUTPATIENT
Start: 2021-06-30

## 2021-06-30 RX ORDER — TOPIRAMATE 25 MG/1
25 TABLET ORAL 2 TIMES DAILY
Qty: 60 TABLET | Refills: 3 | Status: SHIPPED | OUTPATIENT
Start: 2021-06-30

## 2021-06-30 RX ORDER — ASPIRIN 81 MG/1
81 TABLET, CHEWABLE ORAL DAILY
Qty: 30 TABLET | Refills: 3 | Status: SHIPPED | OUTPATIENT
Start: 2021-06-30

## 2021-06-30 RX ORDER — CLOPIDOGREL BISULFATE 75 MG/1
75 TABLET ORAL DAILY
Qty: 30 TABLET | Refills: 3 | Status: SHIPPED | OUTPATIENT
Start: 2021-06-30

## 2021-06-30 RX ADMIN — PANTOPRAZOLE SODIUM 40 MG: 40 TABLET, DELAYED RELEASE ORAL at 06:54

## 2021-06-30 RX ADMIN — Medication 10 ML: at 09:44

## 2021-06-30 RX ADMIN — ENOXAPARIN SODIUM 40 MG: 40 INJECTION SUBCUTANEOUS at 09:45

## 2021-06-30 RX ADMIN — CLOPIDOGREL 75 MG: 75 TABLET, FILM COATED ORAL at 09:44

## 2021-06-30 RX ADMIN — ASPIRIN 81 MG CHEWABLE TABLET 81 MG: 81 TABLET CHEWABLE at 09:44

## 2021-06-30 RX ADMIN — Medication 1000 UNITS: at 09:44

## 2021-06-30 RX ADMIN — TOPIRAMATE 25 MG: 25 TABLET, FILM COATED ORAL at 09:44

## 2021-06-30 ASSESSMENT — PAIN SCALES - GENERAL: PAINLEVEL_OUTOF10: 0

## 2021-06-30 NOTE — PROGRESS NOTES
PT SEEN AND EXAMINED. Chart reviewed. meds reviewed. D/w nursing + family as available. EXAM: IN GENERAL, NAD. AWAKE AND ALERT. ROS NEGx10 EXCEPT: HA is better,/84   Pulse 89   Temp 97.8 °F (36.6 °C) (Oral)   Resp 17   Ht 6' (1.829 m)   Wt 180 lb (81.6 kg)   SpO2 94%   BMI 24.41 kg/m²   GEN: A+O NAD. HEENT: NCAT. EOMI. GUY  NECK: NO JVD. TRACH MIDLINE. NO BRUITS. NO THYROMEGALY. LUNGS: CTA BL NO RALES, RHONCHI OR WHEEZES. GOOD EXCURSION. CV: Regular rate and rhythm, NO Murmurs, Rubs, Or gallops  ABD: Soft. Nontender. Normal bowel sounds. No organomegaly  EXT:No clubbing cyanosis or edema  Neuro: Alert and oriented x 3. No focal motor deficits. No sensory deficits. Reflexes appear intact.   Labs/data reviewedLABS: CBC with Differential:    Lab Results   Component Value Date    WBC 6.7 06/24/2021    RBC 4.00 06/24/2021    HGB 13.3 06/24/2021    HCT 38.6 06/24/2021     06/24/2021    MCV 96.5 06/24/2021    MCH 33.3 06/24/2021    MCHC 34.5 06/24/2021    RDW 12.8 06/24/2021    LYMPHOPCT 14.0 06/24/2021    MONOPCT 8.5 06/24/2021    BASOPCT 0.3 06/24/2021    MONOSABS 0.57 06/24/2021    LYMPHSABS 0.94 06/24/2021    EOSABS 0.04 06/24/2021    BASOSABS 0.02 06/24/2021     Platelets:    Lab Results   Component Value Date     06/24/2021     CMP:    Lab Results   Component Value Date     06/24/2021    K 3.8 06/24/2021     06/24/2021    CO2 27 06/24/2021    BUN 20 06/24/2021    CREATININE 1.2 06/24/2021    GFRAA >60 06/24/2021    LABGLOM 57 06/24/2021    GLUCOSE 105 06/24/2021    PROT 6.6 06/24/2021    LABALBU 4.3 06/24/2021    CALCIUM 9.4 06/24/2021    BILITOT 0.5 06/24/2021    ALKPHOS 68 06/24/2021    AST 15 06/24/2021    ALT 17 06/24/2021   ESR=3  Magnesium:  No results found for: MG  LDH:  No results found for: LDH  PT/INR:    Lab Results   Component Value Date    PROTIME 13.9 06/24/2021    INR 1.3 06/24/2021     Last 3 Troponin:  No results found for: TROPONINI  ABG:  No results found for: PH, PCO2, PO2, HCO3, BE, THGB, TCO2, O2SAT  IRON:  No results found for: IRON  IMAGING    CT Head WO Contrast    Result Date: 6/24/2021  EXAMINATION: CT OF THE HEAD WITHOUT CONTRAST  6/24/2021 1:47 am TECHNIQUE: CT of the head was performed without the administration of intravenous contrast. Dose modulation, iterative reconstruction, and/or weight based adjustment of the mA/kV was utilized to reduce the radiation dose to as low as reasonably achievable. COMPARISON: None. HISTORY: ORDERING SYSTEM PROVIDED HISTORY: aphasia TECHNOLOGIST PROVIDED HISTORY: Reason for exam:->aphasia Has a \"code stroke\" or \"stroke alert\" been called? ->Yes Decision Support Exception - unselect if not a suspected or confirmed emergency medical condition->Emergency Medical Condition (MA) What reading provider will be dictating this exam?->CRC FINDINGS: BRAIN/VENTRICLES: There is no acute intracranial hemorrhage, mass effect or midline shift. No abnormal extra-axial fluid collection. The gray-white differentiation is maintained without evidence of an acute infarct. There is no evidence of hydrocephalus. There is moderate cerebral atrophy. 1 ORBITS: The visualized portion of the orbits demonstrate no acute abnormality. SINUSES: The visualized paranasal sinuses and mastoid air cells demonstrate no acute abnormality. SOFT TISSUES/SKULL:  No acute abnormality of the visualized skull or soft tissues. No acute intracranial abnormality. XR CHEST PORTABLE    Result Date: 6/24/2021  EXAMINATION: ONE XRAY VIEW OF THE CHEST 6/24/2021 2:20 am COMPARISON: 04/27/2009 HISTORY: ORDERING SYSTEM PROVIDED HISTORY: weakness fever TECHNOLOGIST PROVIDED HISTORY: Reason for exam:->weakness fever What reading provider will be dictating this exam?->CRC FINDINGS: There is no cardiomegaly. There is no congestion, infiltrate, pleural effusion or pneumothorax. There is mild elevation of the left hemidiaphragm. No acute abnormality identified. CTA NECK W CONTRAST    Result Date: 2021  Patient MRN:  [id number] : 1931 Age: 80 years Gender: Male Order Date:  2021 1:55 AM EXAM: CTA NECK W CONTRAST, CTA HEAD W CONTRAST NUMBER OF IMAGES:  [id number] INDICATION:  aphasia aphasia Decision Support Exception - unselect if not a suspected or confirmed emergency medical condition->Emergency Medical Condition (MA) What reading provider will be dictating this exam?->[facility] COMPARISON: None Technique: Low-dose CT  acquisition technique included one of following options; 1 . Automated exposure control, 2. Adjustment of MA and or KV according to patient's size or 3. Use of iterative reconstruction. Contiguous spiral images were obtained in the axial plane, following the administration of intravenous contrast using CT angiographic protocol. Sagittal and coronal images were reconstructed from the axial plane acquisition. Additional MIP reconstructions were presented to aid in the interpretation of this study. Images were obtained from the skull base cranially. There is mild calcified plaque identified in the vessels compatible with atherosclerotic disease. There is aortic arch and great vessels demonstrate mild atherosclerotic disease. The right carotid is abnormal. There is no evidence for hemodynamically significant stenosis at the level the proximal internal carotid artery. By NASCET criteria estimated stenosis is 50% or less The left carotid is abnormal. There is no evidence for hemodynamically significant stenosis at the level the proximal internal carotid artery. By NASCET criteria estimated stenosis is 50% or less The right vertebral artery is unremarkable. The left vertebral artery is unremarkable. The basilar artery is unremarkable. The middle cerebral arteries are unremarkable. The anterior cerebral arteries are unremarkable. The posterior cerebral arteries are unremarkable. 1.  Mild atherosclerotic disease . No large vessel occlusion identified 2. Estimated stenosis of the proximal right and left internal carotid artery by NASCET criteria is not hemodynamically significant This study was analyzed by the [employer]. ai algorithm. CT BRAIN PERFUSION    Result Date: 2021  Patient MRN: [id number] : 1931 Age:  80 years Gender: Male Order Date: 2021 1:55 AM Exam: CT BRAIN PERFUSION Number of Images: 333 views Indication:   aphasia aphasia Decision Support Exception - unselect if not a suspected or confirmed emergency medical condition->Emergency Medical Condition (MA) What reading provider will be dictating this exam?->[facility] Comparison: None. Findings: Perfusion images demonstrate symmetric blood volume Blood flow images demonstrate symmetric blood flow There is no significant ischemic penumbra identified. There is no significant core infarct identified. No significant ischemic penumbra identified This study was analyzed by the [employer]. ai algorithm. CTA HEAD W CONTRAST    Result Date: 2021  Patient MRN:  [id number] : 1931 Age: 80 years Gender: Male Order Date:  2021 1:55 AM EXAM: CTA NECK W CONTRAST, CTA HEAD W CONTRAST NUMBER OF IMAGES:  [id number] INDICATION:  aphasia aphasia Decision Support Exception - unselect if not a suspected or confirmed emergency medical condition->Emergency Medical Condition (MA) What reading provider will be dictating this exam?->[facility] COMPARISON: None Technique: Low-dose CT  acquisition technique included one of following options; 1 . Automated exposure control, 2. Adjustment of MA and or KV according to patient's size or 3. Use of iterative reconstruction. Contiguous spiral images were obtained in the axial plane, following the administration of intravenous contrast using CT angiographic protocol. Sagittal and coronal images were reconstructed from the axial plane acquisition. Additional MIP reconstructions were presented to aid in the interpretation of this study.  Images were obtained from the skull base cranially. There is mild calcified plaque identified in the vessels compatible with atherosclerotic disease. There is aortic arch and great vessels demonstrate mild atherosclerotic disease. The right carotid is abnormal. There is no evidence for hemodynamically significant stenosis at the level the proximal internal carotid artery. By NASCET criteria estimated stenosis is 50% or less The left carotid is abnormal. There is no evidence for hemodynamically significant stenosis at the level the proximal internal carotid artery. By NASCET criteria estimated stenosis is 50% or less The right vertebral artery is unremarkable. The left vertebral artery is unremarkable. The basilar artery is unremarkable. The middle cerebral arteries are unremarkable. The anterior cerebral arteries are unremarkable. The posterior cerebral arteries are unremarkable. 1.  Mild atherosclerotic disease . No large vessel occlusion identified 2. Estimated stenosis of the proximal right and left internal carotid artery by NASCET criteria is not hemodynamically significant This study was analyzed by the [employer]. ai algorithm. DIET:  ADULT DIET;  Regular  Adult Oral Nutrition Supplement; Standard High Calorie/High Protein Oral Supplement    Medications:    Scheduled Meds:   topiramate  25 mg Oral BID    sodium chloride flush  5-40 mL Intravenous 2 times per day    enoxaparin  40 mg Subcutaneous Daily    Vitamin D  1,000 Units Oral Daily    pantoprazole  40 mg Oral QAM AC    sodium chloride flush  5-40 mL Intravenous 2 times per day    atorvastatin  40 mg Oral Nightly    aspirin  81 mg Oral Daily    clopidogrel  75 mg Oral Daily       Continuous Infusions:   sodium chloride      sodium chloride         PRN Meds:butalbital-acetaminophen-caffeine, sodium chloride flush, sodium chloride, ondansetron **OR** ondansetron, meclizine, sodium chloride flush, sodium chloride, polyethylene glycol, acetaminophen **OR** acetaminophen, hydrALAZINE    A/P:      Patient Active Problem List   Diagnosis    Primary osteoarthritis involving multiple joints    Neck pain    Ataxia    Neuropathy    Gastroesophageal reflux disease without esophagitis    Vitamin D deficiency    B12 deficiency    Bilateral hearing loss    Diverticulosis    BPH (benign prostatic hyperplasia)    Slow transit constipation    Bilateral carpal tunnel syndrome    Cervical radiculopathy    MCI (mild cognitive impairment)    New onset of headaches after age 48   [employer] Altered mental status    Acute confusional migraine    INTRACTABLE HA    PLAN:plan dc for [facility] when bed avail   CONT TOPAMAX  PT/OT  dw pt  WILL DC HOME  >35 min spent on discharge preparations, including  Today's exam/note, medication reconciliation, discussing with nursing/family if available and the patient  I can be reached though [employer] or [facility] at [phone]

## 2021-06-30 NOTE — DISCHARGE SUMMARY
Physician Discharge Summary     Patient ID:  Bishop Cortes  [id number]  17 y.o.  11/8/1931    Admit date: 6/24/2021    Discharge date and time: 6/30/2021  Admitting Physician: Jose Dyer MD     Discharge Physician: Jose Dyer MD      Admission Diagnoses: Aphasia [R47.01]    Discharge Diagnoses:   confusional migraine: Causing new onset headache and altered mental status. Admission Condition: poor    Discharged Condition: good    Indication for Admission: Aphasia [R47.01]    Hospital Course: PT HAD FULL NEURO WORKUP WHICH WAS NEGATIVE. NO STROKE. HE WAS STARTED ON TOPAMAX. HA IMPROVED. Consults: neurology and rehabilitation medicine    Significant Diagnostic Studies: AS BELOW    Lab Results   Component Value Date     06/24/2021    K 3.8 06/24/2021     06/24/2021    CO2 27 06/24/2021    BUN 20 06/24/2021    CREATININE 1.2 06/24/2021    GLUCOSE 105 (H) 06/24/2021    CALCIUM 9.4 06/24/2021    PROT 6.6 06/24/2021    LABALBU 4.3 06/24/2021    BILITOT 0.5 06/24/2021    ALKPHOS 68 06/24/2021    AST 15 06/24/2021    ALT 17 06/24/2021    LABGLOM 57 06/24/2021    GFRAA >60 06/24/2021          Lab Results   Component Value Date    WBC 6.7 06/24/2021    HGB 13.3 06/24/2021    HCT 38.6 06/24/2021    MCV 96.5 06/24/2021     06/24/2021        CT Head WO Contrast    Result Date: 6/24/2021  EXAMINATION: CT OF THE HEAD WITHOUT CONTRAST  6/24/2021 1:47 am TECHNIQUE: CT of the head was performed without the administration of intravenous contrast. Dose modulation, iterative reconstruction, and/or weight based adjustment of the mA/kV was utilized to reduce the radiation dose to as low as reasonably achievable. COMPARISON: None. HISTORY: ORDERING SYSTEM PROVIDED HISTORY: aphasia TECHNOLOGIST PROVIDED HISTORY: Reason for exam:->aphasia Has a \"code stroke\" or \"stroke alert\" been called? ->Yes Decision Support Exception - unselect if not a suspected or confirmed emergency medical condition->Emergency Medical Condition (MA) What reading provider will be dictating this exam?->CRC FINDINGS: BRAIN/VENTRICLES: There is no acute intracranial hemorrhage, mass effect or midline shift. No abnormal extra-axial fluid collection. The gray-white differentiation is maintained without evidence of an acute infarct. There is no evidence of hydrocephalus. There is moderate cerebral atrophy. 1 ORBITS: The visualized portion of the orbits demonstrate no acute abnormality. SINUSES: The visualized paranasal sinuses and mastoid air cells demonstrate no acute abnormality. SOFT TISSUES/SKULL:  No acute abnormality of the visualized skull or soft tissues. No acute intracranial abnormality. XR CHEST PORTABLE    Result Date: 2021  EXAMINATION: ONE XRAY VIEW OF THE CHEST 2021 2:20 am COMPARISON: 2009 HISTORY: ORDERING SYSTEM PROVIDED HISTORY: weakness fever TECHNOLOGIST PROVIDED HISTORY: Reason for exam:->weakness fever What reading provider will be dictating this exam?->CRC FINDINGS: There is no cardiomegaly. There is no congestion, infiltrate, pleural effusion or pneumothorax. There is mild elevation of the left hemidiaphragm. No acute abnormality identified. CTA NECK W CONTRAST    Result Date: 2021  Patient MRN:  [id number] : 1931 Age: 80 years Gender: Male Order Date:  2021 1:55 AM EXAM: CTA NECK W CONTRAST, CTA HEAD W CONTRAST NUMBER OF IMAGES:  [id number] INDICATION:  aphasia aphasia Decision Support Exception - unselect if not a suspected or confirmed emergency medical condition->Emergency Medical Condition (MA) What reading provider will be dictating this exam?->[facility] COMPARISON: None Technique: Low-dose CT  acquisition technique included one of following options; 1 . Automated exposure control, 2. Adjustment of MA and or KV according to patient's size or 3. Use of iterative reconstruction.  Contiguous spiral images were obtained in the axial plane, following the administration of intravenous contrast using CT angiographic protocol. Sagittal and coronal images were reconstructed from the axial plane acquisition. Additional MIP reconstructions were presented to aid in the interpretation of this study. Images were obtained from the skull base cranially. There is mild calcified plaque identified in the vessels compatible with atherosclerotic disease. There is aortic arch and great vessels demonstrate mild atherosclerotic disease. The right carotid is abnormal. There is no evidence for hemodynamically significant stenosis at the level the proximal internal carotid artery. By NASCET criteria estimated stenosis is 50% or less The left carotid is abnormal. There is no evidence for hemodynamically significant stenosis at the level the proximal internal carotid artery. By NASCET criteria estimated stenosis is 50% or less The right vertebral artery is unremarkable. The left vertebral artery is unremarkable. The basilar artery is unremarkable. The middle cerebral arteries are unremarkable. The anterior cerebral arteries are unremarkable. The posterior cerebral arteries are unremarkable. 1.  Mild atherosclerotic disease . No large vessel occlusion identified 2. Estimated stenosis of the proximal right and left internal carotid artery by NASCET criteria is not hemodynamically significant This study was analyzed by the Viz. ai algorithm. CT BRAIN PERFUSION    Result Date: 2021  Patient MRN: [id number] : 1931 Age:  80 years Gender: Male Order Date: 2021 1:55 AM Exam: CT BRAIN PERFUSION Number of Images: 333 views Indication:   aphasia aphasia Decision Support Exception - unselect if not a suspected or confirmed emergency medical condition->Emergency Medical Condition (MA) What reading provider will be dictating this exam?->[facility] Comparison: None.  Findings: Perfusion images demonstrate symmetric blood volume Blood flow images demonstrate symmetric blood flow There is no significant ischemic penumbra identified. There is no significant core infarct identified. No significant ischemic penumbra identified This study was analyzed by the Viz. ai algorithm. CTA HEAD W CONTRAST    Result Date: 2021  Patient MRN:  [id number] : 1931 Age: 80 years Gender: Male Order Date:  2021 1:55 AM EXAM: CTA NECK W CONTRAST, CTA HEAD W CONTRAST NUMBER OF IMAGES:  [id number] INDICATION:  aphasia aphasia Decision Support Exception - unselect if not a suspected or confirmed emergency medical condition->Emergency Medical Condition (MA) What reading provider will be dictating this exam?->[facility] COMPARISON: None Technique: Low-dose CT  acquisition technique included one of following options; 1 . Automated exposure control, 2. Adjustment of MA and or KV according to patient's size or 3. Use of iterative reconstruction. Contiguous spiral images were obtained in the axial plane, following the administration of intravenous contrast using CT angiographic protocol. Sagittal and coronal images were reconstructed from the axial plane acquisition. Additional MIP reconstructions were presented to aid in the interpretation of this study. Images were obtained from the skull base cranially. There is mild calcified plaque identified in the vessels compatible with atherosclerotic disease. There is aortic arch and great vessels demonstrate mild atherosclerotic disease. The right carotid is abnormal. There is no evidence for hemodynamically significant stenosis at the level the proximal internal carotid artery. By NASCET criteria estimated stenosis is 50% or less The left carotid is abnormal. There is no evidence for hemodynamically significant stenosis at the level the proximal internal carotid artery. By NASCET criteria estimated stenosis is 50% or less The right vertebral artery is unremarkable. The left vertebral artery is unremarkable. The basilar artery is unremarkable. The middle cerebral arteries are unremarkable.  The anterior cerebral arteries are unremarkable. The posterior cerebral arteries are unremarkable. 1.  Mild atherosclerotic disease . No large vessel occlusion identified 2. Estimated stenosis of the proximal right and left internal carotid artery by NASCET criteria is not hemodynamically significant This study was analyzed by the Viz. ai algorithm. MRI BRAIN W WO CONTRAST    Result Date: 6/25/2021  EXAMINATION: MRI OF THE BRAIN WITHOUT AND WITH CONTRAST  6/25/2021 6:45 am TECHNIQUE: Multiplanar multisequence MRI of the head/brain was performed without and with the administration of intravenous contrast. COMPARISON: CT head without contrast, 06/24/2021 HISTORY: ORDERING SYSTEM PROVIDED HISTORY: new onset aphasia TECHNOLOGIST PROVIDED HISTORY: Reason for exam:->new onset aphasia What reading provider will be dictating this exam?->[facility] FINDINGS: INTRACRANIAL STRUCTURES/VENTRICLES:  There is no acute infarct. No mass, mass effect, edema or hemorrhage is seen. Mild-to-moderate volume loss is seen in the brain with mild chronic microvascular ischemic changes. Both are in the range that is typical for age. ORBITS: Prosthetic lenses are seen in the globes bilaterally. The orbits are otherwise grossly unremarkable. SINUSES: Mild mucosal thickening in the paranasal sinuses. Trace right mastoid effusion. BONES/SOFT TISSUES: The bone marrow signal intensity appears normal. The soft tissues demonstrate no acute abnormality. 1. No acute intracranial abnormality. 2. The pre and post contrast enhanced MRI of the brain is unremarkable for age. Outstanding Order Results     No orders found from 5/26/2021 to 6/25/2021.           Treatments: TOPAMAX    Discharge Exam:  /84   Pulse 89   Temp 97.8 °F (36.6 °C) (Oral)   Resp 17   Ht 6' (1.829 m)   Wt 180 lb (81.6 kg)   SpO2 94%   BMI 24.41 kg/m²     General Appearance:    Alert, cooperative, no distress, appears stated age   Head:    Normocephalic, without obvious abnormality, atraumatic   Eyes:    PERRL, conjunctiva/corneas clear, EOM's intact, fundi     benign, both eyes        Ears:    Normal TM's and external ear canals, both ears   Nose:   Nares normal, septum midline, mucosa normal, no drainage    or sinus tenderness   Throat:   Lips, mucosa, and tongue normal; teeth and gums normal   Neck:   Supple, symmetrical, trachea midline, no adenopathy;        thyroid:  No enlargement/tenderness/nodules; no carotid    bruit or JVD   Back:     Symmetric, no curvature, ROM normal, no CVA tenderness   Lungs:     Clear to auscultation bilaterally, respirations unlabored   Chest wall:    No tenderness or deformity   Heart:    Regular rate and rhythm, S1 and S2 normal, no murmur, rub   or gallop   Abdomen:     Soft, non-tender, bowel sounds active all four quadrants,     no masses, no organomegaly   Genitalia:    Normal male without lesion, discharge or tenderness   Rectal:    Normal tone, normal prostate, no masses or tenderness;    guaiac negative stool   Extremities:   Extremities normal, atraumatic, no cyanosis or edema   Pulses:   2+ and symmetric all extremities   Skin:   Skin color, texture, turgor normal, no rashes or lesions   Lymph nodes:   Cervical, supraclavicular, and axillary nodes normal   Neurologic:   CNII-XII intact.  Normal strength, sensation and reflexes       throughout       Disposition: home    Patient Instructions:      Medication List      START taking these medications    aspirin 81 MG chewable tablet  Take 1 tablet by mouth daily     atorvastatin 40 MG tablet  Commonly known as: LIPITOR  Take 1 tablet by mouth nightly     clopidogrel 75 MG tablet  Commonly known as: PLAVIX  Take 1 tablet by mouth daily     topiramate 25 MG tablet  Commonly known as: TOPAMAX  Take 1 tablet by mouth 2 times daily        CHANGE how you take these medications    * meclizine 12.5 MG tablet  Commonly known as: ANTIVERT  What changed: Another medication with the same name was changed. Make sure you understand how and when to take each. * meclizine 12.5 MG tablet  Commonly known as: ANTIVERT  Take 1 tablet by mouth 3 times daily as needed for Dizziness  What changed:   · medication strength  · how much to take  · how to take this  · when to take this  · reasons to take this  · additional instructions         * This list has 2 medication(s) that are the same as other medications prescribed for you. Read the directions carefully, and ask your doctor or other care provider to review them with you. CONTINUE taking these medications    Cyanocobalamin 5000 MCG Caps     gabapentin 300 MG capsule  Commonly known as: NEURONTIN     omeprazole 40 MG delayed release capsule  Commonly known as: PRILOSEC  Take 1 capsule by mouth daily     phenazopyridine 100 MG tablet  Commonly known as: PYRIDIUM     Turmeric 450 MG Caps     Vitamin D3 25 MCG (1000 UT) Caps           Where to Get Your Medications      These medications were sent to [address] [place] [address] [phone] Sohaarlene Pita [phone]  [facility] [place] [address]    Phone: [phone]   · aspirin 81 MG chewable tablet  · atorvastatin 40 MG tablet  · clopidogrel 75 MG tablet  · meclizine 12.5 MG tablet  · topiramate 25 MG tablet        Activity: activity as tolerated  Diet: {diet: ADULT DIET; Regular  Adult Oral Nutrition Supplement; Standard High Calorie/High Protein Oral Supplement  Wound Care: none needed    Follow-up with DR in 1 week.   Greater than 35 minutes spent on discharge preparations, examining patient, discussing with patient and coordinating with nurses and staff    Signed:  Viviane Borjas MD  6/30/2021  7:01 AM

## 2021-06-30 NOTE — PROGRESS NOTES
Physician to Follow Referral: Jude Acosta MD    I certify that I, or a nurse practitioner or physician assistant working with me, had an in-person encounter with Migdalia Alaniz on 6/30/2021 and the reason for the home care services is documented in the clinical note for that day. Jose Seaman was assessed on the above date and was found to have medical conditions requiring Home Care that included CPA, MEDS, PT,OT. Homebound Status: further, I certify that my clinical findings support that Migdalia Alaniz does meet the Medicare definition of \"Confined to the Home\" because of   Deconditioned due to medical condition     Certification and medical necessity: I certify that, based on my findings, the following services are medically necessary home health services for Migdalia Alaniz for the following reasons:  - Vital signs monitoring: Nurse to perform BP, HR, RR, Temp, and Weight with each visit and teach patient and caregivers on taking daily. Nurse to call physician if pulse less than 50 or greater than 120, respiratory rate less than 12 or greater than 25, oral temperature greater than or equal to 910 oF, systolic BP less than 90 or greater than 618, diastolic BP less than 50 or greater than 100. - Medication compliance and education for  new medications changes in previous medications safety concerns  - Cardiopulmonary assessment and monitoring due to recent  APHASIA, [address],  Monitoring to include oxygen saturation by pulse oximeter on  room air only and  at rest only Notify provider if any deterioration in cardiopulmonary status or oxygen saturation less than 90%.   - Consult Physical Therapy for  Evaluate and Treat  - Consult Occupational Therapy for  Evaluate and Treat   >35 min spent on discharge preparations, including  Today's exam/note, medication reconciliation, discussing with nursing/family if available and the patient

## 2021-06-30 NOTE — CARE COORDINATION
RN will have discharge paperwork ready just after noon. Wife's phone has been busy. Finally reached Wife Valentine Menard to inform that RN will have paperwork ready after noon. Wife will let Dtr know.    Pina Aldridge, EPHRAIM.S.W.  [phone]

## 2025-03-04 NOTE — H&P
negative  RESPIRATORY:  negative  CARDIOVASCULAR:  negative  GASTROINTESTINAL:  negative  GENITOURINARY:  negative  INTEGUMENT/BREAST:  negative  HEMATOLOGIC/LYMPHATIC:  negative  ALLERGIC/IMMUNOLOGIC:  negative  PHYSICAL EXAM:    Vitals:  /65   Pulse 86   Temp 98.6 °F (37 °C) (Oral)   Resp 16   Ht 6' (1.829 m)   Wt 180 lb (81.6 kg)   SpO2 95%   BMI 24.41 kg/m²     CONSTITUTIONAL:  awake, alert, cooperative, no apparent distress, and appears stated age  EYES:  Lids and lashes normal, pupils equal, round and reactive to light, extra ocular muscles intact, sclera clear, conjunctiva normal  ENT:  Normocephalic, without obvious abnormality, atraumatic, sinuses nontender on palpation, external ears without lesions, oral pharynx with moist mucus membranes, tonsils without erythema or exudates, gums normal and good dentition. NECK:  Supple, symmetrical, trachea midline, no adenopathy, thyroid symmetric, not enlarged and no tenderness, skin normal  HEMATOLOGIC/LYMPHATICS:  no cervical lymphadenopathy  BACK:  Symmetric, no curvature, spinous processes are non-tender on palpation, paraspinous muscles are non-tender on palpation, no costal vertebral tenderness  LUNGS:  No increased work of breathing, good air exchange, clear to auscultation bilaterally, no crackles or wheezing  CARDIOVASCULAR:  Normal apical impulse, regular rate and rhythm, normal S1 and S2, no S3 or S4, and no murmur noted  ABDOMEN:  Soft, nontender. Normal bs    MUSCULOSKELETAL:  There is no redness, warmth, or swelling of the joints. Full range of motion noted. Motor strength is 5 out of 5 all extremities bilaterally. Tone is normal.  NEUROLOGIC:  Awake, alert, oriented to name, place and time. Cranial nerves II-XII are grossly intact. Motor is 5 out of 5 bilaterally. Cerebellar finger to nose, heel to shin intact. Sensory is intact. Babinski down going, Romberg negative, and gait is ATAXIC.     DATA:  CBC:   Lab Results   Component Value Date    WBC 6.7 06/24/2021    RBC 4.00 06/24/2021    HGB 13.3 06/24/2021    HCT 38.6 06/24/2021    MCV 96.5 06/24/2021    MCH 33.3 06/24/2021    MCHC 34.5 06/24/2021    RDW 12.8 06/24/2021     06/24/2021    MPV 9.4 06/24/2021     CMP:    Lab Results   Component Value Date     06/24/2021    K 3.8 06/24/2021     06/24/2021    CO2 27 06/24/2021    BUN 20 06/24/2021    CREATININE 1.2 06/24/2021    GFRAA >60 06/24/2021    LABGLOM 57 06/24/2021    GLUCOSE 105 06/24/2021    PROT 6.6 06/24/2021    LABALBU 4.3 06/24/2021    CALCIUM 9.4 06/24/2021    BILITOT 0.5 06/24/2021    ALKPHOS 68 06/24/2021    AST 15 06/24/2021    ALT 17 06/24/2021     LDH:  No results found for: LDH  Warfarin PT/INR:  No components found for: PTPATWAR, PTINRWAR  Last 3 Troponin:  No results found for: TROPONINI  ABG:  No results found for: PH, PCO2, PO2, HCO3, BE, THGB, TCO2, O2SAT  HgBA1c:  No results found for: LABA1C  TSH:  No results found for: TSH  VITAMIN B12: No components found for: B12  FOLATE:  No results found for: FOLATE  IRON:  No results found for: IRON  Iron Saturation:  No components found for: PERCENTFE  TIBC:  No results found for: TIBC  FERRITIN:  No results found for: FERRITIN  RPR:  No results found for: RPR  CATRACHO:  No results found for: ANATITER, CATRACHO  AMYLASE:  No results found for: AMYLASE  LIPASE:  No results found for: LIPASE    IMAGING    CT Head WO Contrast    Result Date: 6/24/2021  EXAMINATION: CT OF THE HEAD WITHOUT CONTRAST  6/24/2021 1:47 am TECHNIQUE: CT of the head was performed without the administration of intravenous contrast. Dose modulation, iterative reconstruction, and/or weight based adjustment of the mA/kV was utilized to reduce the radiation dose to as low as reasonably achievable. COMPARISON: None. HISTORY: ORDERING SYSTEM PROVIDED HISTORY: aphasia TECHNOLOGIST PROVIDED HISTORY: Reason for exam:->aphasia Has a \"code stroke\" or \"stroke alert\" been called? ->Yes Decision Support Exception - unselect if not a suspected or confirmed emergency medical condition->Emergency Medical Condition (MA) What reading provider will be dictating this exam?->[facility] FINDINGS: BRAIN/VENTRICLES: There is no acute intracranial hemorrhage, mass effect or midline shift. No abnormal extra-axial fluid collection. The gray-white differentiation is maintained without evidence of an acute infarct. There is no evidence of hydrocephalus. There is moderate cerebral atrophy. 1 ORBITS: The visualized portion of the orbits demonstrate no acute abnormality. SINUSES: The visualized paranasal sinuses and mastoid air cells demonstrate no acute abnormality. SOFT TISSUES/SKULL:  No acute abnormality of the visualized skull or soft tissues. No acute intracranial abnormality. XR CHEST PORTABLE    Result Date: 2021  EXAMINATION: ONE XRAY VIEW OF THE CHEST 2021 2:20 am COMPARISON: 2009 HISTORY: ORDERING SYSTEM PROVIDED HISTORY: weakness fever TECHNOLOGIST PROVIDED HISTORY: Reason for exam:->weakness fever What reading provider will be dictating this exam?->[facility] FINDINGS: There is no cardiomegaly. There is no congestion, infiltrate, pleural effusion or pneumothorax. There is mild elevation of the left hemidiaphragm. No acute abnormality identified. CTA NECK W CONTRAST    Result Date: 2021  Patient MRN:  [id number] : 1931 Age: 80 years Gender: Male Order Date:  2021 1:55 AM EXAM: CTA NECK W CONTRAST, CTA HEAD W CONTRAST NUMBER OF IMAGES:  [id number] INDICATION:  aphasia aphasia Decision Support Exception - unselect if not a suspected or confirmed emergency medical condition->Emergency Medical Condition (MA) What reading provider will be dictating this exam?->[facility] COMPARISON: None Technique: Low-dose CT  acquisition technique included one of following options; 1 . Automated exposure control, 2. Adjustment of MA and or KV according to patient's size or 3. Use of iterative reconstruction. Contiguous spiral images were obtained in the axial plane, following the administration of intravenous contrast using CT angiographic protocol. Sagittal and coronal images were reconstructed from the axial plane acquisition. Additional MIP reconstructions were presented to aid in the interpretation of this study. Images were obtained from the skull base cranially. There is mild calcified plaque identified in the vessels compatible with atherosclerotic disease. There is aortic arch and great vessels demonstrate mild atherosclerotic disease. The right carotid is abnormal. There is no evidence for hemodynamically significant stenosis at the level the proximal internal carotid artery. By NASCET criteria estimated stenosis is 50% or less The left carotid is abnormal. There is no evidence for hemodynamically significant stenosis at the level the proximal internal carotid artery. By NASCET criteria estimated stenosis is 50% or less The right vertebral artery is unremarkable. The left vertebral artery is unremarkable. The basilar artery is unremarkable. The middle cerebral arteries are unremarkable. The anterior cerebral arteries are unremarkable. The posterior cerebral arteries are unremarkable. 1.  Mild atherosclerotic disease . No large vessel occlusion identified 2. Estimated stenosis of the proximal right and left internal carotid artery by NASCET criteria is not hemodynamically significant This study was analyzed by the Viz. ai algorithm. CT BRAIN PERFUSION    Result Date: 2021  Patient MRN: [id number] : 1931 Age:  80 years Gender: Male Order Date: 2021 1:55 AM Exam: CT BRAIN PERFUSION Number of Images: 333 views Indication:   aphasia aphasia Decision Support Exception - unselect if not a suspected or confirmed emergency medical condition->Emergency Medical Condition (MA) What reading provider will be dictating this exam?->[facility] Comparison: None.  Findings: Perfusion images demonstrate symmetric unremarkable. The basilar artery is unremarkable. The middle cerebral arteries are unremarkable. The anterior cerebral arteries are unremarkable. The posterior cerebral arteries are unremarkable. 1.  Mild atherosclerotic disease . No large vessel occlusion identified 2. Estimated stenosis of the proximal right and left internal carotid artery by NASCET criteria is not hemodynamically significant This study was analyzed by the Viz. ai algorithm.      ASSESSMENT AND PLAN:    TIA  CERVICAL STENOSIS  YPERTENSIVE EMERGENCY  NEURO CS  CONTROL BP  PT/OT    I can be reached though [facility] or [facility] [place] at [phone] n/a